# Patient Record
Sex: MALE | Race: WHITE | HISPANIC OR LATINO | Employment: PART TIME | URBAN - METROPOLITAN AREA
[De-identification: names, ages, dates, MRNs, and addresses within clinical notes are randomized per-mention and may not be internally consistent; named-entity substitution may affect disease eponyms.]

---
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
## 2020-04-22 ENCOUNTER — TELEPHONE (OUTPATIENT)
Dept: FAMILY MEDICINE CLINIC | Facility: CLINIC | Age: 20
End: 2020-04-22

## 2020-09-22 ENCOUNTER — OFFICE VISIT (OUTPATIENT)
Dept: FAMILY MEDICINE CLINIC | Facility: CLINIC | Age: 20
End: 2020-09-22

## 2020-09-22 VITALS
WEIGHT: 215 LBS | DIASTOLIC BLOOD PRESSURE: 84 MMHG | HEIGHT: 72 IN | SYSTOLIC BLOOD PRESSURE: 134 MMHG | BODY MASS INDEX: 29.12 KG/M2 | RESPIRATION RATE: 16 BRPM | TEMPERATURE: 99.1 F | HEART RATE: 74 BPM

## 2020-09-22 DIAGNOSIS — Z00.00 WELL ADULT EXAM: Primary | ICD-10-CM

## 2020-09-22 DIAGNOSIS — I10 ESSENTIAL HYPERTENSION: ICD-10-CM

## 2020-09-22 DIAGNOSIS — M32.9 SYSTEMIC LUPUS ERYTHEMATOSUS, UNSPECIFIED SLE TYPE, UNSPECIFIED ORGAN INVOLVEMENT STATUS (HCC): ICD-10-CM

## 2020-09-22 DIAGNOSIS — M32.14 STAGE IV LUPUS NEPHRITIS (WHO) (HCC): ICD-10-CM

## 2020-09-22 PROCEDURE — 99385 PREV VISIT NEW AGE 18-39: CPT | Performed by: FAMILY MEDICINE

## 2020-09-22 RX ORDER — LOSARTAN POTASSIUM 50 MG/1
50 TABLET ORAL DAILY
Qty: 90 TABLET | Refills: 1 | Status: SHIPPED | OUTPATIENT
Start: 2020-09-22 | End: 2021-10-15

## 2020-09-22 NOTE — PROGRESS NOTES
Subjective   Avi Mandel is a 20 y.o. male.     History of Present Illness     Avi Mandel 20 y.o. male who presents for yearly preventive exam.  His overall health is: good  He exercises he has been doing cardio and weights..  He does see his dentist regularly  His diet is varied diet and is healthy 75%  He describes his alcohol intake as none, pt is underage  He knows what his cholesterol is No  He is not sexually active  He does not have ED or other bedroom issues  Does patient smoke sometimes      He needs a new rheumatologist and possibly a new nephrologist  His rheum was peds and they will no longer see him due to age  He has not seen a nephrolgist in 3 years    Mood can be an issue  Can affect his motivation and his energy level  Does not feel he needs medicine at this time      The following portions of the patient's history were reviewed and updated as appropriate: allergies, current medications, past family history, past medical history, past social history, past surgical history and problem list.    Review of Systems   Constitutional: Negative.    HENT: Negative.    Eyes: Negative.    Respiratory: Negative.  Negative for shortness of breath.    Cardiovascular: Negative.  Negative for chest pain.   Gastrointestinal: Negative.  Negative for constipation and diarrhea.   Musculoskeletal: Negative.    Skin: Negative.    Neurological: Negative.    Psychiatric/Behavioral: Negative.  Negative for dysphoric mood. The patient is not nervous/anxious.    All other systems reviewed and are negative.      Objective   Physical Exam  Vitals signs and nursing note reviewed.   Constitutional:       General: He is not in acute distress.     Appearance: He is well-developed.   HENT:      Head: Normocephalic and atraumatic.      Right Ear: Tympanic membrane, ear canal and external ear normal.      Left Ear: Tympanic membrane, ear canal and external ear normal.      Nose: Nose normal.   Eyes:      Conjunctiva/sclera:  Conjunctivae normal.   Neck:      Musculoskeletal: Normal range of motion and neck supple.      Thyroid: No thyromegaly.   Cardiovascular:      Rate and Rhythm: Normal rate and regular rhythm.      Heart sounds: Normal heart sounds. No murmur.   Pulmonary:      Effort: Pulmonary effort is normal. No respiratory distress.      Breath sounds: Normal breath sounds.   Abdominal:      General: Bowel sounds are normal. There is no distension.      Palpations: Abdomen is soft. There is no mass.      Tenderness: There is no abdominal tenderness.   Lymphadenopathy:      Cervical: No cervical adenopathy.   Skin:     General: Skin is warm and dry.   Neurological:      Mental Status: He is alert and oriented to person, place, and time.   Psychiatric:         Behavior: Behavior normal.         Thought Content: Thought content normal.         Judgment: Judgment normal.         Assessment/Plan   Diagnoses and all orders for this visit:    Well adult exam    Essential hypertension  -     CBC & Differential  -     Comprehensive Metabolic Panel  -     losartan (COZAAR) 50 MG tablet; Take 1 tablet by mouth Daily.  -     Lipid Panel    Systemic lupus erythematosus, unspecified SLE type, unspecified organ involvement status (CMS/[facility])  -     Ambulatory Referral to Rheumatology  -     PASCUAL With / DsDNA, RNP, Sjogrens A / B, Smith  -     Sedimentation Rate  -     Rheumatoid Factor    Stage IV lupus nephritis (WHO) (CMS/[facility])  -     Comprehensive Metabolic Panel    counseled pt about well adult care including diet and exercise.  Will check labs for HTN, lupus, and kidney disease.  Will f/u pending results  No change in BP medicine, he is doing well on it  Will work on new rheumatologist and hold off on nephrologist pending labs

## 2020-09-23 LAB
ALBUMIN SERPL-MCNC: 4.7 G/DL (ref 3.5–5.2)
ALBUMIN/GLOB SERPL: 2.2 G/DL
ALP SERPL-CCNC: 61 U/L (ref 39–117)
ALT SERPL-CCNC: 25 U/L (ref 1–41)
ANA SER QL: NEGATIVE
AST SERPL-CCNC: 22 U/L (ref 1–40)
BASOPHILS # BLD AUTO: 0.05 10*3/MM3 (ref 0–0.2)
BASOPHILS NFR BLD AUTO: 0.9 % (ref 0–1.5)
BILIRUB SERPL-MCNC: 0.9 MG/DL (ref 0–1.2)
BUN SERPL-MCNC: 15 MG/DL (ref 6–20)
BUN/CREAT SERPL: 16.9 (ref 7–25)
CALCIUM SERPL-MCNC: 9.6 MG/DL (ref 8.6–10.5)
CHLORIDE SERPL-SCNC: 100 MMOL/L (ref 98–107)
CHOLEST SERPL-MCNC: 129 MG/DL (ref 0–200)
CO2 SERPL-SCNC: 24.1 MMOL/L (ref 22–29)
CREAT SERPL-MCNC: 0.89 MG/DL (ref 0.76–1.27)
EOSINOPHIL # BLD AUTO: 0.1 10*3/MM3 (ref 0–0.4)
EOSINOPHIL NFR BLD AUTO: 1.9 % (ref 0.3–6.2)
ERYTHROCYTE [DISTWIDTH] IN BLOOD BY AUTOMATED COUNT: 14 % (ref 12.3–15.4)
ERYTHROCYTE [SEDIMENTATION RATE] IN BLOOD BY WESTERGREN METHOD: 1 MM/HR (ref 0–15)
GLOBULIN SER CALC-MCNC: 2.1 GM/DL
GLUCOSE SERPL-MCNC: 88 MG/DL (ref 65–99)
HCT VFR BLD AUTO: 53.9 % (ref 37.5–51)
HDLC SERPL-MCNC: 48 MG/DL (ref 40–60)
HGB BLD-MCNC: 18.7 G/DL (ref 13–17.7)
IMM GRANULOCYTES # BLD AUTO: 0.02 10*3/MM3 (ref 0–0.05)
IMM GRANULOCYTES NFR BLD AUTO: 0.4 % (ref 0–0.5)
LDLC SERPL CALC-MCNC: 67 MG/DL (ref 0–100)
LYMPHOCYTES # BLD AUTO: 1.53 10*3/MM3 (ref 0.7–3.1)
LYMPHOCYTES NFR BLD AUTO: 28.4 % (ref 19.6–45.3)
MCH RBC QN AUTO: 29.9 PG (ref 26.6–33)
MCHC RBC AUTO-ENTMCNC: 34.7 G/DL (ref 31.5–35.7)
MCV RBC AUTO: 86.2 FL (ref 79–97)
MONOCYTES # BLD AUTO: 0.52 10*3/MM3 (ref 0.1–0.9)
MONOCYTES NFR BLD AUTO: 9.7 % (ref 5–12)
NEUTROPHILS # BLD AUTO: 3.16 10*3/MM3 (ref 1.7–7)
NEUTROPHILS NFR BLD AUTO: 58.7 % (ref 42.7–76)
NRBC BLD AUTO-RTO: 0 /100 WBC (ref 0–0.2)
PLATELET # BLD AUTO: 223 10*3/MM3 (ref 140–450)
POTASSIUM SERPL-SCNC: 4 MMOL/L (ref 3.5–5.2)
PROT SERPL-MCNC: 6.8 G/DL (ref 6–8.5)
RBC # BLD AUTO: 6.25 10*6/MM3 (ref 4.14–5.8)
RHEUMATOID FACT SERPL-ACNC: <10 IU/ML (ref 0–13.9)
SODIUM SERPL-SCNC: 140 MMOL/L (ref 136–145)
TRIGL SERPL-MCNC: 70 MG/DL (ref 0–150)
VLDLC SERPL CALC-MCNC: 14 MG/DL
WBC # BLD AUTO: 5.38 10*3/MM3 (ref 3.4–10.8)

## 2020-09-24 DIAGNOSIS — D75.1 POLYCYTHEMIA: Primary | ICD-10-CM

## 2020-10-16 ENCOUNTER — LAB (OUTPATIENT)
Dept: FAMILY MEDICINE CLINIC | Facility: CLINIC | Age: 20
End: 2020-10-16

## 2020-10-26 LAB
BASOPHILS # BLD AUTO: 0.1 X10E3/UL (ref 0–0.2)
BASOPHILS NFR BLD AUTO: 1 %
EOSINOPHIL # BLD AUTO: 0.1 X10E3/UL (ref 0–0.4)
EOSINOPHIL NFR BLD AUTO: 1 %
EPO SERPL-ACNC: 19.6 MIU/ML (ref 2.6–18.5)
ERYTHROCYTE [DISTWIDTH] IN BLOOD BY AUTOMATED COUNT: 13.8 % (ref 11.6–15.4)
HCT VFR BLD AUTO: 53.6 % (ref 37.5–51)
HGB BLD-MCNC: 18.6 G/DL (ref 13–17.7)
IMM GRANULOCYTES # BLD AUTO: 0 X10E3/UL (ref 0–0.1)
IMM GRANULOCYTES NFR BLD AUTO: 0 %
JAK2 P.V617F BLD/T QL: NORMAL
LAB DIRECTOR NAME PROVIDER: NORMAL
LABORATORY COMMENT REPORT: NORMAL
LYMPHOCYTES # BLD AUTO: 1.5 X10E3/UL (ref 0.7–3.1)
LYMPHOCYTES NFR BLD AUTO: 29 %
MCH RBC QN AUTO: 30.1 PG (ref 26.6–33)
MCHC RBC AUTO-ENTMCNC: 34.7 G/DL (ref 31.5–35.7)
MCV RBC AUTO: 87 FL (ref 79–97)
MONOCYTES # BLD AUTO: 0.5 X10E3/UL (ref 0.1–0.9)
MONOCYTES NFR BLD AUTO: 9 %
NEUTROPHILS # BLD AUTO: 3.1 X10E3/UL (ref 1.4–7)
NEUTROPHILS NFR BLD AUTO: 60 %
PATH INTERP BLD-IMP: ABNORMAL
PATH REV BLD -IMP: ABNORMAL
PATHOLOGIST NAME: ABNORMAL
PLATELET # BLD AUTO: 195 X10E3/UL (ref 150–450)
RBC # BLD AUTO: 6.17 X10E6/UL (ref 4.14–5.8)
WBC # BLD AUTO: 5.1 X10E3/UL (ref 3.4–10.8)

## 2020-10-27 DIAGNOSIS — D75.1 POLYCYTHEMIA: Primary | ICD-10-CM

## 2020-11-10 ENCOUNTER — LAB (OUTPATIENT)
Dept: LAB | Facility: HOSPITAL | Age: 20
End: 2020-11-10

## 2020-11-10 ENCOUNTER — CONSULT (OUTPATIENT)
Dept: ONCOLOGY | Facility: CLINIC | Age: 20
End: 2020-11-10

## 2020-11-10 VITALS
DIASTOLIC BLOOD PRESSURE: 106 MMHG | TEMPERATURE: 98.2 F | SYSTOLIC BLOOD PRESSURE: 180 MMHG | WEIGHT: 214 LBS | BODY MASS INDEX: 28.99 KG/M2 | HEIGHT: 72 IN | RESPIRATION RATE: 16 BRPM | OXYGEN SATURATION: 97 % | HEART RATE: 78 BPM

## 2020-11-10 DIAGNOSIS — D75.1 POLYCYTHEMIA: Primary | ICD-10-CM

## 2020-11-10 DIAGNOSIS — I10 ESSENTIAL HYPERTENSION: Primary | ICD-10-CM

## 2020-11-10 DIAGNOSIS — D75.1 POLYCYTHEMIA: ICD-10-CM

## 2020-11-10 LAB
ABO GROUP BLD: NORMAL
ALBUMIN SERPL-MCNC: 4.6 G/DL (ref 3.5–5.2)
ALBUMIN/GLOB SERPL: 1.8 G/DL
ALP SERPL-CCNC: 63 U/L (ref 39–117)
ALT SERPL W P-5'-P-CCNC: 25 U/L (ref 1–41)
ANION GAP SERPL CALCULATED.3IONS-SCNC: 10 MMOL/L (ref 5–15)
AST SERPL-CCNC: 24 U/L (ref 1–40)
BILIRUB SERPL-MCNC: 0.5 MG/DL (ref 0–1.2)
BUN SERPL-MCNC: 17 MG/DL (ref 6–20)
BUN/CREAT SERPL: 18.3 (ref 7–25)
CALCIUM SPEC-SCNC: 9.6 MG/DL (ref 8.6–10.5)
CHLORIDE SERPL-SCNC: 101 MMOL/L (ref 98–107)
CO2 SERPL-SCNC: 28 MMOL/L (ref 22–29)
CREAT SERPL-MCNC: 0.93 MG/DL (ref 0.76–1.27)
ERYTHROCYTE [DISTWIDTH] IN BLOOD BY AUTOMATED COUNT: 13.2 % (ref 12.3–15.4)
GFR SERPL CREATININE-BSD FRML MDRD: 104 ML/MIN/1.73
GLOBULIN UR ELPH-MCNC: 2.5 GM/DL
GLUCOSE SERPL-MCNC: 95 MG/DL (ref 65–99)
HCT VFR BLD AUTO: 51.8 % (ref 37.5–51)
HGB BLD-MCNC: 17.6 G/DL (ref 13–17.7)
LYMPHOCYTES # BLD AUTO: 1.6 10*3/MM3 (ref 0.7–3.1)
LYMPHOCYTES NFR BLD AUTO: 23.4 % (ref 19.6–45.3)
MCH RBC QN AUTO: 31 PG (ref 26.6–33)
MCHC RBC AUTO-ENTMCNC: 34.1 G/DL (ref 31.5–35.7)
MCV RBC AUTO: 91.1 FL (ref 79–97)
MONOCYTES # BLD AUTO: 0.2 10*3/MM3 (ref 0.1–0.9)
MONOCYTES NFR BLD AUTO: 3.2 % (ref 5–12)
NEUTROPHILS NFR BLD AUTO: 5 10*3/MM3 (ref 1.7–7)
NEUTROPHILS NFR BLD AUTO: 73.4 % (ref 42.7–76)
PLATELET # BLD AUTO: 201 10*3/MM3 (ref 140–450)
PMV BLD AUTO: 8.6 FL (ref 6–12)
POTASSIUM SERPL-SCNC: 3.6 MMOL/L (ref 3.5–5.2)
PROT SERPL-MCNC: 7.1 G/DL (ref 6–8.5)
RBC # BLD AUTO: 5.68 10*6/MM3 (ref 4.14–5.8)
RH BLD: NEGATIVE
SODIUM SERPL-SCNC: 139 MMOL/L (ref 136–145)
WBC # BLD AUTO: 6.8 10*3/MM3 (ref 3.4–10.8)

## 2020-11-10 PROCEDURE — 82668 ASSAY OF ERYTHROPOIETIN: CPT | Performed by: INTERNAL MEDICINE

## 2020-11-10 PROCEDURE — 85025 COMPLETE CBC W/AUTO DIFF WBC: CPT

## 2020-11-10 PROCEDURE — 81403 MOPATH PROCEDURE LEVEL 4: CPT

## 2020-11-10 PROCEDURE — 36415 COLL VENOUS BLD VENIPUNCTURE: CPT | Performed by: INTERNAL MEDICINE

## 2020-11-10 PROCEDURE — 81270 JAK2 GENE: CPT

## 2020-11-10 PROCEDURE — 86901 BLOOD TYPING SEROLOGIC RH(D): CPT

## 2020-11-10 PROCEDURE — 81402 MOPATH PROCEDURE LEVEL 3: CPT

## 2020-11-10 PROCEDURE — 99204 OFFICE O/P NEW MOD 45 MIN: CPT | Performed by: INTERNAL MEDICINE

## 2020-11-10 PROCEDURE — 80053 COMPREHEN METABOLIC PANEL: CPT

## 2020-11-10 PROCEDURE — 81219 CALR GENE COM VARIANTS: CPT

## 2020-11-10 PROCEDURE — 86900 BLOOD TYPING SEROLOGIC ABO: CPT

## 2020-11-10 NOTE — PROGRESS NOTES
New Patient Office Visit      Date: 11/10/2020     Patient Name: Avi Mandel  MRN: [id number]  : 2000  Referring Physician: Andrea Ivory    Chief Complaint: Establish care for polycythemia    History of Present Illness: Avi Mandel is a pleasant 20 y.o. male past medical history of SLE, hypertension who presents today for evaluation of polycythemia.  Patient has been in his usual state of health when he had labs checked by his PCP was notable for an elevated hemoglobin of 18.7 in 2020.  Repeat in 2020 was 18.6.  Per chart review he has had an elevated hemoglobin to at least 16.9 in 2017.  Patient does note that he has been told he snores at night.  He is also been a heavy Juul smoker as well during this time.  He does not take any exogenous testosterone.  He denies any shortness of breath, pruritus after hot shower, redness of his face.  He had EPO level checked which was elevated to 19.6.  JAK2 V617F mutation was negative.  He otherwise feels well and has no major complaints today.    Oncology History:    Oncology/Hematology History    No history exists.       Subjective      Review of Systems:     Constitutional: Negative for fevers, chills, or weight loss  Eyes: Negative for blurred vision or discharge         Ear/Nose/Throat: Negative for difficulty swallowing, sore throat, LAD                                                       Respiratory: Negative for cough, SOA, wheezing                                                                                        Cardiovascular: Negative for chest pain or palpitations                                                                  Gastrointestinal: Negative for nausea, vomiting or diarrhea                                                                     Genitourinary: Negative for dysuria or hematuria                                                                                           Musculoskeletal:  "Negative for any joint pains or muscle aches                                                                        Neurologic: Negative for any weakness, headaches, dizziness                                                                         Hematologic: Negative for any easy bleeding or bruising                                                                                   Psychiatric: Negative for anxiety or depression                             Past Medical History: History reviewed. No pertinent past medical history.    Past Surgical History:   Past Surgical History:   Procedure Laterality Date   • RENAL BIOPSY  2012       Family History:   Family History   Problem Relation Age of Onset   • No Known Problems Mother    • No Known Problems Father        Social History:   Social History     Socioeconomic History   • Marital status: Single     Spouse name: Not on file   • Number of children: Not on file   • Years of education: Not on file   • Highest education level: Not on file   Occupational History     Employer: [employer]   Tobacco Use   • Smoking status: Light Tobacco Smoker   • Smokeless tobacco: Never Used   Substance and Sexual Activity   • Alcohol use: No       Medications:     Current Outpatient Medications:   •  hydroxychloroquine (PLAQUENIL) 200 MG tablet, TAKE 2 TABLETS BY MOUTH EVERY DAY, Disp: , Rfl:   •  losartan (COZAAR) 50 MG tablet, Take 1 tablet by mouth Daily., Disp: 90 tablet, Rfl: 1    Allergies:   Allergies   Allergen Reactions   • No Known Drug Allergy        Objective     Physical Exam:  Vital Signs:   Vitals:    11/10/20 1119   BP: (!) 180/106  Comment: PT is very anxious   Pulse: 78   Resp: 16   Temp: 98.2 °F (36.8 °C)   SpO2: 97%   Weight: 97.1 kg (214 lb)   Height: 182.9 cm (72\")   PainSc: 0-No pain     Pain Score    11/10/20 1119   PainSc: 0-No pain     ECOG Performance Status: 0 - Asymptomatic    Constitutional: NAD, ECOG 0  Eyes: PERRLA, scleral anicteric  ENT: No LAD, " no thyromegaly  Respiratory: CTAB, no wheezing, rales, rhonchi  Cardiovascular: RRR, no murmurs, pulses 2+ bilaterally  Abdomen: soft, NT/ND, no HSM  Musculoskeletal: strength 5/5 bilaterally, no c/c/e  Neurologic: A&O x 3, CN II-XII intact grossly  Psych: mood and affect congruent, no SI or HI    Results Review:   No visits with results within 2 Week(s) from this visit.   Latest known visit with results is:   Orders Only on 10/16/2020   Component Date Value Ref Range Status   • WBC 10/16/2020 Comment   Final    Comment: No morphologic abnormality was detected on the Platt  stained smear.     • RBC 10/16/2020 Comment   Final    Comment: Erythrocytosis. Further evaluation indicated to distinguish  between primary and secondary causes.  RBC morphology is normal.     • Platelets 10/16/2020 Comment   Final    Few large and giant platelets were observed.   • Comment 10/16/2020 Comment   Final    Clinical correlation is suggested.   • Pathologist Review 10/16/2020 Comment   Final    Reviewed by: Wes Carver MD, Pathologist   • WBC 10/16/2020 5.1  3.4 - 10.8 x10E3/uL Final    **Verified by repeat analysis**   • RBC 10/16/2020 6.17* 4.14 - 5.80 x10E6/uL Final   • Hemoglobin 10/16/2020 18.6* 13.0 - 17.7 g/dL Final   • Hematocrit 10/16/2020 53.6* 37.5 - 51.0 % Final   • MCV 10/16/2020 87  79 - 97 fL Final   • MCH 10/16/2020 30.1  26.6 - 33.0 pg Final   • MCHC 10/16/2020 34.7  31.5 - 35.7 g/dL Final   • RDW 10/16/2020 13.8  11.6 - 15.4 % Final   • Platelets 10/16/2020 195  150 - 450 x10E3/uL Final   • Neutrophil Rel % 10/16/2020 60  Not Estab. % Final   • Lymphocyte Rel % 10/16/2020 29  Not Estab. % Final   • Monocyte Rel % 10/16/2020 9  Not Estab. % Final   • Eosinophil Rel % 10/16/2020 1  Not Estab. % Final   • Basophil Rel % 10/16/2020 1  Not Estab. % Final   • Neutrophils Absolute 10/16/2020 3.1  1.4 - 7.0 x10E3/uL Final   • Lymphocytes Absolute 10/16/2020 1.5  0.7 - 3.1 x10E3/uL Final   • Monocytes Absolute 10/16/2020 0.5   0.1 - 0.9 x10E3/uL Final   • Eosinophils Absolute 10/16/2020 0.1  0.0 - 0.4 x10E3/uL Final   • Basophils Absolute 10/16/2020 0.1  0.0 - 0.2 x10E3/uL Final   • Immature Granulocyte Rel % 10/16/2020 0  Not Estab. % Final   • Immature Grans Absolute 10/16/2020 0.0  0.0 - 0.1 x10E3/uL Final   • JAK2 V617F Mutation 10/16/2020 Comment   Final    Comment: Result: NEGATIVE for the JAK2 V617F mutation.  Interpretation:  The G to T nucleotide change encoding the V617F  mutation was not detected.  This result does not rule out the presence  of the JAK2 mutation at a level below the sensitivity of detection of  this assay, or the presence of other mutations within JAK2 not  detected by this assay.  This result does not rule out a diagnosis of  polycythemia vera, essential thrombocythemia or idiopathic  myelofibrosis as the V617F mutation is not detected in all patients  with these disorders.     • Background: 10/16/2020 Comment   Final    Comment: JAK2 is a cytoplasmic tyrosine kinase with a key role in signal  transduction from multiple hematopoietic growth factor receptors. A  point mutation within exon 14 of the JAK2 gene ([id number]) encoding a  valine to phenylalanine substitution at position 617 of the JAK2  protein (V617F) has been identified in most patients with polycythemia  vera, and in about half of those with either essential thrombocythemia  or idiopathic myelofibrosis. The V617F has also been detected,  although infrequently, in other myeloid disorders such as chronic  myelomonocytic leukemia and chronic neutrophilic luekemia. V617F is  an acquired mutation that alters a highly conserved valine present in  the negative regulatory JH2 domain of the JAK2 protein and is  predicted to dysregulate kinase activity.  Methodology:  Total genomic DNA was extracted and subjected to TaqMan real-time PCR  amplification/detection. Two amplification products per sample were  monitored by real-time PCR using primers/probes  specific                            to JAK2 wild  type (WT) and JAK2 mutant V617F. The [id number] Absolute Quantitation  software will compare the patient specimen valuse to the standard  curves and generate percent values for wild type and mutant type.  In vitro studies have indicated that this assay has an analytical  sensitivity of 1%.  References:  Piter EJ, Lisandro QUINN, Yovany PJ, et al. Acquired mutation of the  tyrosine kinase JAK2 in human myeloproliferative disorders. Lancet.  2005 Mar 19-25; 365(8093):0041-3600.  Parker C, Jeferson V, Roya Cornejo MUSHTAQ. A unique clonal JAK2 mutation leading  to constitutive signaling causes polycythaemia vera. Nature. 2005 Apr 28; 434(8876):2021-7436.  Adonis R, Hiram F, Callie AS, et al. A gain-of-function  mutation of JAK2 in myeloproliferative disorders. N Engl J Med. 2005 Apr 28; 352(31):2064-4744.     • Director Review 10/16/2020 Comment   Final    Comment: Nelda Nunez MD, PhD  Director, Molecular Oncology  [facility] [place] [address]  [phone]  This test was developed and its performance characteristics  determined by [facility]. It has not been cleared or approved  by the Food and Drug Administration.     • Erythropoietin 10/16/2020 19.6* 2.6 - 18.5 mIU/mL Final    Comment: eSight DxI 800 Immunoassay System  Values obtained with different assay methods or kits cannot be used  interchangeably. Results cannot be interpreted as absolute evidence  of the presence or absence of malignant disease.         No results found.    Assessment / Plan      Assessment/Plan:   Diagnoses and all orders for this visit:    1. Polycythemia (Primary)  -Noted on recent labs.  EPO level 19.6.  Eitan V617F mutation negative  -We will check Eitan exon 12/13 mutation, CALR and MPL mutation  -Given elevated EPO level will check CT abdomen/pelvis to rule out any renal lesion  -Given his snoring history will check sleep study  to rule out SHEBA  -     CBC & Differential; Future  -     Comprehensive Metabolic Panel; Future  -     Erythropoietin  -     JAK2 V617F, Rfx CALR/E12-15/MPL; Future  -     CT Abdomen Pelvis With Contrast; Future  -     Ambulatory Referral to Sleep Medicine  -     Type & Screen; Future    2.  Hypertension  -Blood pressure 180/106 today.  Patient compliant with his antihypertensive medications.  -Review of blood pressure shows normal value in September 2020  -Patient states he is very anxious about his appointment today and this is likely why his blood pressure is high  -Advised patient to monitor blood pressure at home and to contact his PCP should his values continue to remain elevated      Follow Up:   Follow-up in 3 weeks     Saad Spicer MD  Hematology and Oncology     Please note that portions of this note may have been completed with a voice recognition program. Efforts were made to edit the dictations, but occasionally words are mistranscribed.

## 2020-11-11 LAB — EPO SERPL-ACNC: 5.1 MIU/ML (ref 2.6–18.5)

## 2020-11-23 LAB
CALR EXON 9 MUT ANL BLD/T: NORMAL
CITATION REF LAB TEST: NORMAL
JAK2 GENE MUT ANL BLD/T: NORMAL
JAK2 P.V617F BLD/T QL: NORMAL
LAB DIRECTOR NAME PROVIDER: NORMAL
LABORATORY COMMENT REPORT: NORMAL
LABORATORY COMMENT REPORT: NORMAL
Lab: NORMAL
MPL GENE MUT TESTED BLD/T: NORMAL
MPL P.W515L+W515K+S505N BLD/T QL: NORMAL
REF LAB TEST METHOD: NORMAL
REF LAB TEST METHOD: NORMAL
REFLEX: NORMAL
SERVICE CMNT-IMP: NORMAL
SPECIMEN PREPARATION: NORMAL
SPECIMEN PREPARATION: NORMAL

## 2021-01-27 ENCOUNTER — TELEPHONE (OUTPATIENT)
Dept: FAMILY MEDICINE CLINIC | Facility: CLINIC | Age: 21
End: 2021-01-27

## 2021-01-27 NOTE — TELEPHONE ENCOUNTER
Typically this type of letter would come from his rheumatologist that is treating his lupus.  I recommend he speak with them

## 2021-01-27 NOTE — TELEPHONE ENCOUNTER
PATIENT CALLED AND HE SAID HIS SCHOOL ([employer] [employer])  IS WANTING DR MONTANEZ TO WRITE HIM A LETTER  AS HE HAS LUPUS AND IT IS AN AUTOIMMUNE DISORDER ; HE SAID SAID THIS  LETTER WOULD ALLOW HIM TO TAKE ONLINE CLASSES VS CLASSES IN PERSON; PATIENT SAID IT WOULD BE OK IF HE NEEDS TO MAKE AN APPT WITH DR AVINA; PLEASE CALL TO ADVISE..    ADELINA: [phone]    PATIENT ALSO SAID LAST November HE HAD TO WITHDRAWAL FROM A CLASS DUE NOT TO FEELING WELL HE WASN'T SURE IF HE HAD COVD THEN OR NOT--ITS A SAP APPEAL

## 2021-03-11 ENCOUNTER — CONSULT (OUTPATIENT)
Dept: SLEEP MEDICINE | Facility: HOSPITAL | Age: 21
End: 2021-03-11

## 2021-03-11 VITALS
HEART RATE: 85 BPM | BODY MASS INDEX: 29.16 KG/M2 | SYSTOLIC BLOOD PRESSURE: 165 MMHG | HEIGHT: 73 IN | DIASTOLIC BLOOD PRESSURE: 101 MMHG | OXYGEN SATURATION: 97 % | WEIGHT: 220 LBS

## 2021-03-11 DIAGNOSIS — D75.1 POLYCYTHEMIA: ICD-10-CM

## 2021-03-11 DIAGNOSIS — G47.33 OSA (OBSTRUCTIVE SLEEP APNEA): ICD-10-CM

## 2021-03-11 DIAGNOSIS — G47.19 EXCESSIVE DAYTIME SLEEPINESS: Primary | ICD-10-CM

## 2021-03-11 DIAGNOSIS — R06.83 SNORING: ICD-10-CM

## 2021-03-11 PROCEDURE — 99244 OFF/OP CNSLTJ NEW/EST MOD 40: CPT | Performed by: INTERNAL MEDICINE

## 2021-03-11 RX ORDER — LISINOPRIL 10 MG/1
10 TABLET ORAL DAILY
COMMUNITY
End: 2021-10-15 | Stop reason: SDUPTHER

## 2021-03-11 NOTE — PROGRESS NOTES
"Avi Mandel is a 21 y.o. male.   Chief Complaint   Patient presents with   • Sleeping Problem       HPI     21 y.o. male seen in consultation at the request of Saad Spicer MD for evaluation of the above.     He underwent routine lab studies ordered by Dr. Ivory and was found to have an elevated hemoglobin.  This has been repeatedly present.  He has a history of hypertension as well as lupus nephritis.  He is on no testosterone.  He has no known lung disease.  The etiology of his polycythemia was unclear and he was referred to Dr. Spicer.  A hematologic work-up has ensued but because of the patient's observed snoring in the past as well as daytime somnolence he was referred here for further evaluation for possible sleep apnea as a cause of secondary polycythemia.    The patient does note excessive daytime somnolence.  He does awaken with headaches in the morning.  He has been noted to snore.  Nobody observed to sleep at this point so he does not know if he has significant apneas.  He averages 6-8 hours of sleep per night.    He denies any hypnagogic hallucinations or frequent sleep paralysis.  He thinks he has experienced sleep paralysis once in his life.  He has no cataplexy.    [place] Scale is: 10/24    The patient's relevant past medical, surgical, family, and social history reviewed and updated in Epic as appropriate.    Current medications are:   Current Outpatient Medications:   •  hydroxychloroquine (PLAQUENIL) 200 MG tablet, TAKE 2 TABLETS BY MOUTH EVERY DAY, Disp: , Rfl:   •  lisinopril (PRINIVIL,ZESTRIL) 10 MG tablet, Take 10 mg by mouth Daily., Disp: , Rfl:   •  losartan (COZAAR) 50 MG tablet, Take 1 tablet by mouth Daily., Disp: 90 tablet, Rfl: 1.    Review of Systems    Review of Systems  ROS documented in patient questionnaire ×14 systems.  Reviewed with patient.  Otherwise negative except as noted in HPI.    Physical Exam    Blood pressure (!) 165/101, pulse 85, height 185.4 cm (73\"), " weight 99.8 kg (220 lb), SpO2 97 %. Body mass index is 29.03 kg/m².    Physical Exam  Vitals and nursing note reviewed.   Constitutional:       Appearance: Normal appearance. He is well-developed.   HENT:      Head: Normocephalic and atraumatic.      Nose: Nose normal.      Mouth/Throat:      Mouth: Mucous membranes are moist.      Pharynx: Oropharynx is clear. No oropharyngeal exudate.      Comments: Clas III airway  Eyes:      General: No scleral icterus.     Conjunctiva/sclera: Conjunctivae normal.   Neck:      Thyroid: No thyromegaly.      Trachea: No tracheal deviation.   Cardiovascular:      Rate and Rhythm: Normal rate and regular rhythm.      Heart sounds: No murmur. No friction rub. No gallop.    Pulmonary:      Effort: Pulmonary effort is normal. No respiratory distress.      Breath sounds: No wheezing or rales.   Musculoskeletal:         General: No deformity. Normal range of motion.   Skin:     General: Skin is warm and dry.      Findings: No rash.   Neurological:      Mental Status: He is alert and oriented to person, place, and time.   Psychiatric:         Behavior: Behavior normal.         Thought Content: Thought content normal.         DATA:    11/10/2020 hemoglobin 17.6    Reviewed Dr. Spicer's note from 11/10/2020 and Dr. Ivory's note from 9/22/2020    ASSESSMENT:    Problem List Items Addressed This Visit        Pulmonary Problems    SHEBA (obstructive sleep apnea) - possible    Relevant Orders    Home Sleep Study    Snoring    Relevant Orders    Home Sleep Study       Other    Excessive daytime sleepiness - Primary    Relevant Orders    Home Sleep Study    Polycythemia          21-year-old male who presents with polycythemia of unclear etiology, snoring, daytime somnolence, and morning headaches.  I think that undiagnosed obstructive sleep apnea is a good possibility and work-up and treatment should be pursued.    PLAN:    1. I discussed the possible diagnosis of obstructive sleep apnea as well  as its potential relationship to his polycythemia and his symptoms.  He expressed understanding and was interested in pursuing a work-up including a home sleep apnea test  2. I discussed treatment options for obstructive sleep apnea including CPAP therapy and he did express interest in initiating CPAP therapy if deemed appropriate in the future.  3. Will advise further after his HSAT and arrange close follow-up in the sleep center    I have reviewed the results of my evaluation and impression and discussed my recommendations in detail with the patient.    Level of Risk Moderate due to: undiagnosed new problem    Signed by  Justin Erwin MD    March 11, 2021      CC: Andrea Ivory MD Peterson, Shawn L, MD

## 2021-10-15 ENCOUNTER — OFFICE VISIT (OUTPATIENT)
Dept: FAMILY MEDICINE CLINIC | Facility: CLINIC | Age: 21
End: 2021-10-15

## 2021-10-15 VITALS
HEIGHT: 74 IN | SYSTOLIC BLOOD PRESSURE: 128 MMHG | TEMPERATURE: 98.6 F | BODY MASS INDEX: 28.49 KG/M2 | RESPIRATION RATE: 18 BRPM | DIASTOLIC BLOOD PRESSURE: 88 MMHG | HEART RATE: 74 BPM | WEIGHT: 222 LBS

## 2021-10-15 DIAGNOSIS — Z00.00 WELL ADULT EXAM: Primary | ICD-10-CM

## 2021-10-15 DIAGNOSIS — D75.1 POLYCYTHEMIA: ICD-10-CM

## 2021-10-15 DIAGNOSIS — M32.14 STAGE IV LUPUS NEPHRITIS (WHO) (HCC): ICD-10-CM

## 2021-10-15 DIAGNOSIS — I10 PRIMARY HYPERTENSION: ICD-10-CM

## 2021-10-15 DIAGNOSIS — Z11.59 ENCOUNTER FOR HEPATITIS C SCREENING TEST FOR LOW RISK PATIENT: ICD-10-CM

## 2021-10-15 DIAGNOSIS — M32.9 SYSTEMIC LUPUS ERYTHEMATOSUS, UNSPECIFIED SLE TYPE, UNSPECIFIED ORGAN INVOLVEMENT STATUS (HCC): ICD-10-CM

## 2021-10-15 DIAGNOSIS — N18.1 CKD (CHRONIC KIDNEY DISEASE) STAGE 1, GFR 90 ML/MIN OR GREATER: ICD-10-CM

## 2021-10-15 PROBLEM — R06.83 SNORING: Status: RESOLVED | Noted: 2021-03-11 | Resolved: 2021-10-15

## 2021-10-15 PROBLEM — G47.19 EXCESSIVE DAYTIME SLEEPINESS: Status: RESOLVED | Noted: 2021-03-11 | Resolved: 2021-10-15

## 2021-10-15 PROCEDURE — 99395 PREV VISIT EST AGE 18-39: CPT | Performed by: FAMILY MEDICINE

## 2021-10-15 PROCEDURE — 86580 TB INTRADERMAL TEST: CPT | Performed by: FAMILY MEDICINE

## 2021-10-15 RX ORDER — LISINOPRIL 10 MG/1
10 TABLET ORAL DAILY
Qty: 90 TABLET | Refills: 1 | Status: SHIPPED | OUTPATIENT
Start: 2021-10-15 | End: 2021-11-09 | Stop reason: SDUPTHER

## 2021-10-15 NOTE — PROGRESS NOTES
Subjective   Avi Mandel is a 21 y.o. male.     History of Present Illness     Avi Mandel 21 y.o. male who presents for yearly preventive exam.  His overall health is: good  He exercises [employer] about 5 times a week.  He does not see his dentist regularly  His diet is typical american  He describes his alcohol intake as none  He knows what his cholesterol is No  He is not sexually active  Does patient smoke No    He needs a form completed for work      The following portions of the patient's history were reviewed and updated as appropriate: allergies, current medications, past family history, past medical history, past social history, past surgical history and problem list.    Review of Systems   Constitutional: Negative.    HENT: Negative.    Respiratory: Negative.    Cardiovascular: Negative.    Gastrointestinal: Negative.    Skin: Negative.    Psychiatric/Behavioral: Negative.        Objective   Physical Exam  Vitals and nursing note reviewed.   Constitutional:       General: He is not in acute distress.     Appearance: Normal appearance. He is well-developed.   HENT:      Head: Normocephalic and atraumatic.      Right Ear: Tympanic membrane, ear canal and external ear normal.      Left Ear: Tympanic membrane, ear canal and external ear normal.      Nose: Nose normal.   Eyes:      Extraocular Movements: Extraocular movements intact.      Conjunctiva/sclera: Conjunctivae normal.   Neck:      Thyroid: No thyromegaly.   Cardiovascular:      Rate and Rhythm: Normal rate and regular rhythm.      Heart sounds: Normal heart sounds. No murmur heard.      Pulmonary:      Effort: Pulmonary effort is normal. No respiratory distress.      Breath sounds: Normal breath sounds.   Abdominal:      General: Bowel sounds are normal. There is no distension.      Palpations: Abdomen is soft.      Tenderness: There is no abdominal tenderness.   Musculoskeletal:      Cervical back: Normal range of motion and neck  supple.   Lymphadenopathy:      Cervical: No cervical adenopathy.   Skin:     General: Skin is warm and dry.   Neurological:      Mental Status: He is alert and oriented to person, place, and time.   Psychiatric:         Mood and Affect: Mood normal.         Behavior: Behavior normal.         Thought Content: Thought content normal.         Judgment: Judgment normal.         Assessment/Plan   Diagnoses and all orders for this visit:    1. Well adult exam (Primary)  -     Cancel: QuantiFERON TB Gold  -     TB Skin Test    2. Primary hypertension  -     lisinopril (PRINIVIL,ZESTRIL) 10 MG tablet; Take 1 tablet by mouth Daily.  Dispense: 90 tablet; Refill: 1  -     CBC & Differential  -     Comprehensive Metabolic Panel  -     ABO/Rh    3. CKD (chronic kidney disease) stage 1, GFR 90 ml/min or greater  -     lisinopril (PRINIVIL,ZESTRIL) 10 MG tablet; Take 1 tablet by mouth Daily.  Dispense: 90 tablet; Refill: 1  -     Comprehensive Metabolic Panel    4. 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  Dispense: 90 tablet; Refill: 1    5. Polycythemia  -     CBC & Differential    6. Systemic lupus erythematosus, unspecified SLE type, unspecified organ involvement status (HCC)  -     Ambulatory Referral to Rheumatology    7. Encounter for hepatitis C screening test for low risk patient  -     Hepatitis C Antibody    form completed for work, no limitations nor issues noted.  Will continue lisinopril and ehck labs including renal function  Rheum referral placed as pt's rheumatologist left town and he needs a new one.  F/u pending labs

## 2021-10-16 LAB
ABO GROUP BLD: NORMAL
ALBUMIN SERPL-MCNC: 4.4 G/DL (ref 3.5–5.2)
ALBUMIN/GLOB SERPL: 1.9 G/DL
ALP SERPL-CCNC: 68 U/L (ref 39–117)
ALT SERPL-CCNC: 25 U/L (ref 1–41)
AST SERPL-CCNC: 24 U/L (ref 1–40)
BASOPHILS # BLD AUTO: 0.06 10*3/MM3 (ref 0–0.2)
BASOPHILS NFR BLD AUTO: 1.2 % (ref 0–1.5)
BILIRUB SERPL-MCNC: 0.6 MG/DL (ref 0–1.2)
BUN SERPL-MCNC: 16 MG/DL (ref 6–20)
BUN/CREAT SERPL: 17.8 (ref 7–25)
CALCIUM SERPL-MCNC: 9.4 MG/DL (ref 8.6–10.5)
CHLORIDE SERPL-SCNC: 101 MMOL/L (ref 98–107)
CO2 SERPL-SCNC: 29.5 MMOL/L (ref 22–29)
CREAT SERPL-MCNC: 0.9 MG/DL (ref 0.76–1.27)
EOSINOPHIL # BLD AUTO: 0.08 10*3/MM3 (ref 0–0.4)
EOSINOPHIL NFR BLD AUTO: 1.6 % (ref 0.3–6.2)
ERYTHROCYTE [DISTWIDTH] IN BLOOD BY AUTOMATED COUNT: 13.5 % (ref 12.3–15.4)
GLOBULIN SER CALC-MCNC: 2.3 GM/DL
GLUCOSE SERPL-MCNC: 82 MG/DL (ref 65–99)
HCT VFR BLD AUTO: 54.8 % (ref 37.5–51)
HCV AB S/CO SERPL IA: <0.1 S/CO RATIO (ref 0–0.9)
HGB BLD-MCNC: 18.3 G/DL (ref 13–17.7)
IMM GRANULOCYTES # BLD AUTO: 0.01 10*3/MM3 (ref 0–0.05)
IMM GRANULOCYTES NFR BLD AUTO: 0.2 % (ref 0–0.5)
LYMPHOCYTES # BLD AUTO: 1.33 10*3/MM3 (ref 0.7–3.1)
LYMPHOCYTES NFR BLD AUTO: 26.7 % (ref 19.6–45.3)
MCH RBC QN AUTO: 29 PG (ref 26.6–33)
MCHC RBC AUTO-ENTMCNC: 33.4 G/DL (ref 31.5–35.7)
MCV RBC AUTO: 87 FL (ref 79–97)
MONOCYTES # BLD AUTO: 0.42 10*3/MM3 (ref 0.1–0.9)
MONOCYTES NFR BLD AUTO: 8.4 % (ref 5–12)
NEUTROPHILS # BLD AUTO: 3.08 10*3/MM3 (ref 1.7–7)
NEUTROPHILS NFR BLD AUTO: 61.9 % (ref 42.7–76)
NRBC BLD AUTO-RTO: 0 /100 WBC (ref 0–0.2)
PLATELET # BLD AUTO: 233 10*3/MM3 (ref 140–450)
POTASSIUM SERPL-SCNC: 4.1 MMOL/L (ref 3.5–5.2)
PROT SERPL-MCNC: 6.7 G/DL (ref 6–8.5)
RBC # BLD AUTO: 6.3 10*6/MM3 (ref 4.14–5.8)
RH BLD: NEGATIVE
SODIUM SERPL-SCNC: 142 MMOL/L (ref 136–145)
WBC # BLD AUTO: 4.98 10*3/MM3 (ref 3.4–10.8)

## 2021-10-18 LAB
INDURATION: 0 MM (ref 0–10)
Lab: NORMAL
Lab: NORMAL
TB SKIN TEST: NEGATIVE

## 2021-10-20 ENCOUNTER — LAB (OUTPATIENT)
Dept: FAMILY MEDICINE CLINIC | Facility: CLINIC | Age: 21
End: 2021-10-20

## 2021-10-20 ENCOUNTER — TELEPHONE (OUTPATIENT)
Dept: FAMILY MEDICINE CLINIC | Facility: CLINIC | Age: 21
End: 2021-10-20

## 2021-10-20 DIAGNOSIS — D75.1 POLYCYTHEMIA: Primary | ICD-10-CM

## 2021-10-20 NOTE — TELEPHONE ENCOUNTER
This is likely a stable elevation og his hemoglobin and hematocrit but I do want to do some more labs and set him up with blood specialist for further evaluation.  Orders placed (ok to come in and have drawn at his convenience) and will work on referral to blood specialist to see if anything further is needed.    Don't want him to freak out but would like to look into this minor elevation of his blood counts.

## 2021-10-25 ENCOUNTER — TELEPHONE (OUTPATIENT)
Dept: FAMILY MEDICINE CLINIC | Facility: CLINIC | Age: 21
End: 2021-10-25

## 2021-10-25 NOTE — TELEPHONE ENCOUNTER
Pt has physical scheduled 10/26 but recently had one.  Can we call and see if this needs to be canceled.

## 2021-11-01 LAB
BASOPHILS # BLD AUTO: 0.1 X10E3/UL (ref 0–0.2)
BASOPHILS NFR BLD AUTO: 1 %
CALR EXON 9 MUT ANL BLD/T: NORMAL
CITATION REF LAB TEST: NORMAL
EOSINOPHIL # BLD AUTO: 0.1 X10E3/UL (ref 0–0.4)
EOSINOPHIL NFR BLD AUTO: 1 %
EPO SERPL-ACNC: 19 MIU/ML (ref 2.6–18.5)
ERYTHROCYTE [DISTWIDTH] IN BLOOD BY AUTOMATED COUNT: 13.4 % (ref 11.6–15.4)
HCT VFR BLD AUTO: 54.1 % (ref 37.5–51)
HGB BLD-MCNC: 17.9 G/DL (ref 13–17.7)
IMM GRANULOCYTES # BLD AUTO: 0 X10E3/UL (ref 0–0.1)
IMM GRANULOCYTES NFR BLD AUTO: 0 %
JAK2 P.V617F BLD/T QL: NORMAL
LAB DIRECTOR NAME PROVIDER: NORMAL
LABORATORY COMMENT REPORT: NORMAL
LYMPHOCYTES # BLD AUTO: 1.3 X10E3/UL (ref 0.7–3.1)
LYMPHOCYTES NFR BLD AUTO: 18 %
Lab: NORMAL
MCH RBC QN AUTO: 29.1 PG (ref 26.6–33)
MCHC RBC AUTO-ENTMCNC: 33.1 G/DL (ref 31.5–35.7)
MCV RBC AUTO: 88 FL (ref 79–97)
MONOCYTES # BLD AUTO: 0.5 X10E3/UL (ref 0.1–0.9)
MONOCYTES NFR BLD AUTO: 7 %
MPL GENE MUT TESTED BLD/T: NORMAL
MPL P.W515L+W515K+S505N BLD/T QL: NORMAL
NEUTROPHILS # BLD AUTO: 5.2 X10E3/UL (ref 1.4–7)
NEUTROPHILS NFR BLD AUTO: 73 %
PATH INTERP BLD-IMP: ABNORMAL
PATH REV BLD -IMP: ABNORMAL
PATHOLOGIST NAME: ABNORMAL
PLATELET # BLD AUTO: 205 X10E3/UL (ref 150–450)
RBC # BLD AUTO: 6.16 X10E6/UL (ref 4.14–5.8)
REF LAB TEST METHOD: NORMAL
REF LAB TEST METHOD: NORMAL
REFLEX: NORMAL
SERVICE CMNT-IMP: NORMAL
SPECIMEN PREPARATION: NORMAL
WBC # BLD AUTO: 7.1 X10E3/UL (ref 3.4–10.8)

## 2021-11-09 ENCOUNTER — OFFICE VISIT (OUTPATIENT)
Dept: FAMILY MEDICINE CLINIC | Facility: CLINIC | Age: 21
End: 2021-11-09

## 2021-11-09 VITALS
RESPIRATION RATE: 18 BRPM | HEIGHT: 74 IN | OXYGEN SATURATION: 98 % | DIASTOLIC BLOOD PRESSURE: 98 MMHG | SYSTOLIC BLOOD PRESSURE: 148 MMHG | TEMPERATURE: 97.8 F | WEIGHT: 228.6 LBS | HEART RATE: 77 BPM | BODY MASS INDEX: 29.34 KG/M2

## 2021-11-09 DIAGNOSIS — N18.1 CKD (CHRONIC KIDNEY DISEASE) STAGE 1, GFR 90 ML/MIN OR GREATER: ICD-10-CM

## 2021-11-09 DIAGNOSIS — I10 RESISTANT HYPERTENSION: ICD-10-CM

## 2021-11-09 DIAGNOSIS — I10 PRIMARY HYPERTENSION: ICD-10-CM

## 2021-11-09 DIAGNOSIS — M32.14 STAGE IV LUPUS NEPHRITIS (WHO) (HCC): ICD-10-CM

## 2021-11-09 DIAGNOSIS — R41.840 ATTENTION DEFICIT: Primary | ICD-10-CM

## 2021-11-09 PROCEDURE — 99214 OFFICE O/P EST MOD 30 MIN: CPT | Performed by: FAMILY MEDICINE

## 2021-11-09 RX ORDER — LISINOPRIL 20 MG/1
20 TABLET ORAL DAILY
Qty: 30 TABLET | Refills: 1 | Status: SHIPPED | OUTPATIENT
Start: 2021-11-09 | End: 2023-02-03 | Stop reason: SDUPTHER

## 2021-11-09 RX ORDER — ATOMOXETINE 40 MG/1
40 CAPSULE ORAL DAILY
Qty: 7 CAPSULE | Refills: 0 | Status: SHIPPED | OUTPATIENT
Start: 2021-11-09 | End: 2023-03-07 | Stop reason: SDUPTHER

## 2021-11-09 RX ORDER — ATOMOXETINE 80 MG/1
80 CAPSULE ORAL DAILY
Qty: 30 CAPSULE | Refills: 1 | Status: SHIPPED | OUTPATIENT
Start: 2021-11-09 | End: 2023-03-07 | Stop reason: SDUPTHER

## 2021-11-09 NOTE — PROGRESS NOTES
Subjective   Avi Mandel is a 21 y.o. male.     History of Present Illness     He notes that he has been struggling with school  [employer] is where he is going  Just cannot focus and stay on target  Focus has been an issue since he was younger    His BP is still very high and I rechecked it at 162/94  He is taking lisinopril 10  He does not check his BP at home  No CP noted    Still waiting hematology eval for polycyethmia of undetermined cause.    Review of Systems   Constitutional: Negative.    Psychiatric/Behavioral: Positive for decreased concentration.       Objective   Physical Exam  Vitals and nursing note reviewed.   Constitutional:       General: He is not in acute distress.     Appearance: Normal appearance. He is well-developed.   Cardiovascular:      Rate and Rhythm: Normal rate and regular rhythm.      Heart sounds: Normal heart sounds.   Pulmonary:      Effort: Pulmonary effort is normal.      Breath sounds: Normal breath sounds.   Neurological:      Mental Status: He is alert and oriented to person, place, and time.   Psychiatric:         Mood and Affect: Mood normal.         Behavior: Behavior normal.         Thought Content: Thought content normal.         Judgment: Judgment normal.         Assessment/Plan   Diagnoses and all orders for this visit:    1. Attention deficit (Primary)  -     atomoxetine (Strattera) 40 MG capsule; Take 1 capsule by mouth Daily. The first week  Dispense: 7 capsule; Refill: 0  -     atomoxetine (Strattera) 80 MG capsule; Take 1 capsule by mouth Daily.  Dispense: 30 capsule; Refill: 1    2. Primary hypertension  -     lisinopril (PRINIVIL,ZESTRIL) 20 MG tablet; Take 1 tablet by mouth Daily.  Dispense: 30 tablet; Refill: 1    3. CKD (chronic kidney disease) stage 1, GFR 90 ml/min or greater  -     lisinopril (PRINIVIL,ZESTRIL) 20 MG tablet; Take 1 tablet by mouth Daily.  Dispense: 30 tablet; Refill: 1    4. 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  Dispense: 30 tablet; Refill: 1    5. Resistant hypertension  -     US Renal Bilateral; Future    discussed attention deficit and told him I would try strattera but if anything else needed he would have to have evaluation by psych for ADHD and they would have to write for any controlled medicine.  Names given to pt if this is needed  BP too high, will check renal artery US and increase lisinopril to 20 and he may even need 40.

## 2021-11-19 ENCOUNTER — TELEPHONE (OUTPATIENT)
Dept: FAMILY MEDICINE CLINIC | Facility: CLINIC | Age: 21
End: 2021-11-19

## 2021-11-22 ENCOUNTER — HOSPITAL ENCOUNTER (OUTPATIENT)
Dept: PULMONOLOGY | Facility: HOSPITAL | Age: 21
Discharge: HOME | End: 2021-11-22

## 2021-11-22 ENCOUNTER — APPOINTMENT (OUTPATIENT)
Dept: ULTRASOUND IMAGING | Facility: HOSPITAL | Age: 21
End: 2021-11-22

## 2021-11-22 ENCOUNTER — LAB (OUTPATIENT)
Dept: LAB | Facility: HOSPITAL | Age: 21
End: 2021-11-22

## 2021-11-22 ENCOUNTER — CONSULT (OUTPATIENT)
Dept: ONCOLOGY | Facility: CLINIC | Age: 21
End: 2021-11-22

## 2021-11-22 VITALS
BODY MASS INDEX: 29 KG/M2 | RESPIRATION RATE: 16 BRPM | WEIGHT: 226 LBS | TEMPERATURE: 97.1 F | DIASTOLIC BLOOD PRESSURE: 109 MMHG | SYSTOLIC BLOOD PRESSURE: 207 MMHG | HEIGHT: 74 IN | OXYGEN SATURATION: 97 % | HEART RATE: 74 BPM

## 2021-11-22 DIAGNOSIS — D75.1 POLYCYTHEMIA: Primary | ICD-10-CM

## 2021-11-22 DIAGNOSIS — D75.1 POLYCYTHEMIA: ICD-10-CM

## 2021-11-22 LAB
ALBUMIN SERPL-MCNC: 4.3 G/DL (ref 3.5–5.2)
ALBUMIN/GLOB SERPL: 1.6 G/DL
ALP SERPL-CCNC: 73 U/L (ref 39–117)
ALT SERPL W P-5'-P-CCNC: 20 U/L (ref 1–41)
ANION GAP SERPL CALCULATED.3IONS-SCNC: 12 MMOL/L (ref 5–15)
ARTERIAL PATENCY WRIST A: ABNORMAL
AST SERPL-CCNC: 22 U/L (ref 1–40)
ATMOSPHERIC PRESS: ABNORMAL MM[HG]
BASE EXCESS BLDA CALC-SCNC: 3.3 MMOL/L (ref 0–2)
BDY SITE: ABNORMAL
BILIRUB SERPL-MCNC: 0.8 MG/DL (ref 0–1.2)
BODY TEMPERATURE: 37 C
BUN SERPL-MCNC: 17 MG/DL (ref 6–20)
BUN/CREAT SERPL: 19.5 (ref 7–25)
CALCIUM SPEC-SCNC: 9.4 MG/DL (ref 8.6–10.5)
CHLORIDE SERPL-SCNC: 101 MMOL/L (ref 98–107)
CO2 BLDA-SCNC: 29.4 MMOL/L (ref 22–33)
CO2 SERPL-SCNC: 26 MMOL/L (ref 22–29)
COHGB MFR BLD: 0.8 % (ref 0–2)
CREAT SERPL-MCNC: 0.87 MG/DL (ref 0.76–1.27)
ERYTHROCYTE [DISTWIDTH] IN BLOOD BY AUTOMATED COUNT: 13.9 % (ref 12.3–15.4)
FERRITIN SERPL-MCNC: 36.38 NG/ML (ref 30–400)
GFR SERPL CREATININE-BSD FRML MDRD: 111 ML/MIN/1.73
GLOBULIN UR ELPH-MCNC: 2.7 GM/DL
GLUCOSE SERPL-MCNC: 94 MG/DL (ref 65–99)
HCO3 BLDA-SCNC: 28.1 MMOL/L (ref 20–26)
HCT VFR BLD AUTO: 54.6 % (ref 37.5–51)
HCT VFR BLD CALC: 56.2 % (ref 38–51)
HGB BLD-MCNC: 18.1 G/DL (ref 13–17.7)
HGB BLDA-MCNC: 18.3 G/DL (ref 13.5–17.5)
INHALED O2 CONCENTRATION: 21 %
IRON 24H UR-MRATE: 126 MCG/DL (ref 59–158)
IRON SATN MFR SERPL: 30 % (ref 20–50)
LDH SERPL-CCNC: 197 U/L (ref 135–225)
LYMPHOCYTES # BLD AUTO: 1.3 10*3/MM3 (ref 0.7–3.1)
LYMPHOCYTES NFR BLD AUTO: 20 % (ref 19.6–45.3)
MCH RBC QN AUTO: 29.5 PG (ref 26.6–33)
MCHC RBC AUTO-ENTMCNC: 33.2 G/DL (ref 31.5–35.7)
MCV RBC AUTO: 88.9 FL (ref 79–97)
METHGB BLD QL: -0.1 % (ref 0–1.5)
MODALITY: ABNORMAL
MONOCYTES # BLD AUTO: 0.4 10*3/MM3 (ref 0.1–0.9)
MONOCYTES NFR BLD AUTO: 6.1 % (ref 5–12)
NEUTROPHILS NFR BLD AUTO: 4.8 10*3/MM3 (ref 1.7–7)
NEUTROPHILS NFR BLD AUTO: 73.9 % (ref 42.7–76)
NOTE: ABNORMAL
OXYHGB MFR BLDV: 97.6 % (ref 94–99)
PCO2 BLDA: 41.9 MM HG (ref 35–45)
PCO2 TEMP ADJ BLD: 41.9 MM HG (ref 35–48)
PH BLDA: 7.43 PH UNITS (ref 7.35–7.45)
PH, TEMP CORRECTED: 7.43 PH UNITS
PLATELET # BLD AUTO: 211 10*3/MM3 (ref 140–450)
PMV BLD AUTO: 8 FL (ref 6–12)
PO2 BLDA: 100 MM HG (ref 83–108)
PO2 TEMP ADJ BLD: 100 MM HG (ref 83–108)
POTASSIUM SERPL-SCNC: 3.9 MMOL/L (ref 3.5–5.2)
PROT SERPL-MCNC: 7 G/DL (ref 6–8.5)
RBC # BLD AUTO: 6.14 10*6/MM3 (ref 4.14–5.8)
SODIUM SERPL-SCNC: 139 MMOL/L (ref 136–145)
TIBC SERPL-MCNC: 416 MCG/DL (ref 298–536)
TRANSFERRIN SERPL-MCNC: 279 MG/DL (ref 200–360)
VENTILATOR MODE: ABNORMAL
WBC NRBC COR # BLD: 6.5 10*3/MM3 (ref 3.4–10.8)

## 2021-11-22 PROCEDURE — 80053 COMPREHEN METABOLIC PANEL: CPT

## 2021-11-22 PROCEDURE — 83615 LACTATE (LD) (LDH) ENZYME: CPT

## 2021-11-22 PROCEDURE — 83540 ASSAY OF IRON: CPT

## 2021-11-22 PROCEDURE — 82668 ASSAY OF ERYTHROPOIETIN: CPT

## 2021-11-22 PROCEDURE — 82375 ASSAY CARBOXYHB QUANT: CPT

## 2021-11-22 PROCEDURE — 83050 HGB METHEMOGLOBIN QUAN: CPT

## 2021-11-22 PROCEDURE — 82728 ASSAY OF FERRITIN: CPT

## 2021-11-22 PROCEDURE — 82805 BLOOD GASES W/O2 SATURATION: CPT

## 2021-11-22 PROCEDURE — 99214 OFFICE O/P EST MOD 30 MIN: CPT | Performed by: INTERNAL MEDICINE

## 2021-11-22 PROCEDURE — 85025 COMPLETE CBC W/AUTO DIFF WBC: CPT

## 2021-11-22 PROCEDURE — 36415 COLL VENOUS BLD VENIPUNCTURE: CPT

## 2021-11-22 PROCEDURE — 36600 WITHDRAWAL OF ARTERIAL BLOOD: CPT

## 2021-11-22 PROCEDURE — 84466 ASSAY OF TRANSFERRIN: CPT

## 2021-11-22 NOTE — PROGRESS NOTES
Follow Up Office Visit      Date: 2021     Patient Name: Avi Mandel  MRN: [id number]  : 2000  Referring Physician: Andrea Ivory     Chief Complaint:  Follow-up for polycythemia     History of Present Illness: Avi Mandel is a pleasant 20 y.o. male past medical history of SLE, hypertension who presents today for evaluation of polycythemia.  Patient has been in his usual state of health when he had labs checked by his PCP was notable for an elevated hemoglobin of 18.7 in 2020.  Repeat in 2020 was 18.6.  Per chart review he has had an elevated hemoglobin to at least 16.9 in 2017.  Patient does note that he has been told he snores at night.  He is also been a heavy Juul smoker as well during this time.  He does not take any exogenous testosterone.  He denies any shortness of breath, pruritus after hot shower, redness of his face.  He had EPO level checked which was elevated to 19.6.  JAK2 V617F mutation was negative.  He otherwise feels well and has no major complaints today.    Interval History:  Presents clinic for follow-up.  Was lost to follow-up over the past year.  Has recently been seen by his PCP who checked a CBC which was notable for continued polycythemia.  Patient did not get his CT scan as ordered last visit.  He was seen by sleep medicine but did not have sleep testing completed although it appears to have been ordered per their notes.  He notes continued fatigue but denies any night sweats, fevers, unexplained weight loss    Oncology History:    Oncology/Hematology History    No history exists.       Subjective      Review of Systems:   Constitutional: Negative for fevers, chills, or weight loss  Eyes: Negative for blurred vision or discharge         Ear/Nose/Throat: Negative for difficulty swallowing, sore throat, LAD                                                       Respiratory: Negative for cough, SOA, wheezing                                     "                                                    Cardiovascular: Negative for chest pain or palpitations                                                                  Gastrointestinal: Negative for nausea, vomiting or diarrhea                                                                     Genitourinary: Negative for dysuria or hematuria                                                                                           Musculoskeletal: Negative for any joint pains or muscle aches                                                                        Neurologic: Negative for any weakness, headaches, dizziness                                                                         Hematologic: Negative for any easy bleeding or bruising                                                                                   Psychiatric: Negative for anxiety or depression                          Past Medical History/Past Surgical History/ Family History/ Social History: Reviewed by me and unchanged from my previous documentation done on November 2020.     Medications:     Current Outpatient Medications:   •  atomoxetine (Strattera) 40 MG capsule, Take 1 capsule by mouth Daily. The first week, Disp: 7 capsule, Rfl: 0  •  atomoxetine (Strattera) 80 MG capsule, Take 1 capsule by mouth Daily., Disp: 30 capsule, Rfl: 1  •  hydroxychloroquine (PLAQUENIL) 200 MG tablet, TAKE 2 TABLETS BY MOUTH EVERY DAY, Disp: , Rfl:   •  lisinopril (PRINIVIL,ZESTRIL) 20 MG tablet, Take 1 tablet by mouth Daily., Disp: 30 tablet, Rfl: 1    Allergies:   Allergies   Allergen Reactions   • No Known Drug Allergy        Objective     Physical Exam:  Vital Signs:   Vitals:    11/22/21 1454   BP: (!) 207/109   Pulse: 74   Resp: 16   Temp: 97.1 °F (36.2 °C)   TempSrc: Temporal   SpO2: 97%   Weight: 103 kg (226 lb)   Height: 188 cm (74.02\")   PainSc: 0-No pain     Pain Score    11/22/21 1454   PainSc: 0-No pain     ECOG Performance Status: 0 " - Asymptomatic    Constitutional: NAD, ECOG 0  Eyes: PERRLA, scleral anicteric  ENT: No LAD, no thyromegaly  Respiratory: CTAB, no wheezing, rales, rhonchi  Cardiovascular: RRR, no murmurs, pulses 2+ bilaterally  Abdomen: soft, NT/ND, no HSM  Musculoskeletal: strength 5/5 bilaterally, no c/c/e  Neurologic: A&O x 3, CN II-XII intact grossly    Results Review:   No visits with results within 2 Week(s) from this visit.   Latest known visit with results is:   Telephone on 10/20/2021   Component Date Value Ref Range Status   • JAK2 V617 Mutation Qnt Result 10/20/2021 Comment   Final    Comment: NEGATIVE  The JAK2 V617F mutation is not detected in the provided specimen of  this individual. This result does not rule out the presence of the  JAK2 mutation at a level below the sensitivity of detection of this  assay, or the presence of other mutations within JAK2 not detected by  this assay.     • Background 10/20/2021 Comment   Final    Comment: The Quantitative Real-Time PCR assay detects V617F mutation  (c.1849 G>T) observed in approximately 95% polycythemia vera (PV), 55%  essential thrombocythemia (ET) and 55% primary myelofibrosis (PMF). It  is also infrequently present (3-5%) in myelodysplastic syndrome,  chronic myelomonocytic leukemia, and other atypical chronic myeloid  disorders. The results should be interpreted in the context of all  clinical and laboratory findings. No therapeutic action should be  taken based solely on these results. This assay detects only the JAK2  V617F point mutation. Other mutations that may occur in the JAK2 gene  will not be detected.     • Methodology 10/20/2021 Comment   Final    Comment: This test was developed and its performance characteristics  determined by [facility]. It has not been cleared or approved  by the Food and Drug Administration.  Total genomic DNA was extracted and subjected to TaqMan real-time PCR  amplification/detection. Two amplification products per sample  were  monitored by real-time PCR using primers/probes specific to JAK2 wild  type (WT) and JAK2 mutant V617F. The [id number] Absolute Quantitation  software will compare the patient specimen values to the standard  curves and generate percent values for wild type and mutant type. The  numerical values of Sample Mutant Quantity/(Sample Mutant Quantity+  Sample Wild Type Quantity)X100 is reported as a percentage. In vitro  studies have indicated that this assay has an analytical sensitivity  of 1%.     • References 10/20/2021 Comment   Final    Comment:   Piter EJ, Lisandro LM, Yovany PJ, et al. Acquired mutation of the  tyrosine kinase JAK2 in human myeloproliferative disorders. Lancet.  2005 Mar 19-25; 365(7662):5598-6701.   Parker C, Jeferson V, Roya Cornejo MUSHTAQ. A unique clonal JAK2 mutation leading  to constitutive signaling causes polycythaemia vera. Nature. 2005 Apr 28; 355(4879):0311-7077.   Adonis R, Hiram F, Callie AS, et al. A gain-of-function  mutation of JAK2 in myeloproliferative disorders. N Engl J Med. 2005  Apr 28; 352(15):0029-2573.     • Director Review 10/20/2021 Comment   Final    Comment: Michael Will, PhD, [facility]      Director, Molecular Oncology      [facility] [place] [address]      [phone]     • Reflex 10/20/2021 Comment   Final    Comment: Reflex to CALR Mutation Analysis and MPL Mutation Analysis  is indicated.     • Erythropoietin 10/20/2021 19.0* 2.6 - 18.5 mIU/mL Final    Comment: [employer] DxI 800 Immunoassay System  Values obtained with different assay methods or kits cannot be used  interchangeably. Results cannot be interpreted as absolute evidence  of the presence or absence of malignant disease.     • WBC 10/20/2021 Appear normal.   Final   • RBC 10/20/2021 Comment   Final    Comment: Erythrocytosis. Further evaluation indicated to distinguish  between primary and secondary causes.     • Platelets 10/20/2021  Appear normal.   Final   • Comment 10/20/2021 Comment   Final   • Pathologist Review 10/20/2021 Comment   Final    Reviewed by: Geovanny Sargent MD, Pathologist   • WBC 10/20/2021 7.1  3.4 - 10.8 x10E3/uL Final   • RBC 10/20/2021 6.16* 4.14 - 5.80 x10E6/uL Final   • Hemoglobin 10/20/2021 17.9* 13.0 - 17.7 g/dL Final   • Hematocrit 10/20/2021 54.1* 37.5 - 51.0 % Final   • MCV 10/20/2021 88  79 - 97 fL Final   • MCH 10/20/2021 29.1  26.6 - 33.0 pg Final   • MCHC 10/20/2021 33.1  31.5 - 35.7 g/dL Final   • RDW 10/20/2021 13.4  11.6 - 15.4 % Final   • Platelets 10/20/2021 205  150 - 450 x10E3/uL Final   • Neutrophil Rel % 10/20/2021 73  Not Estab. % Final   • Lymphocyte Rel % 10/20/2021 18  Not Estab. % Final   • Monocyte Rel % 10/20/2021 7  Not Estab. % Final   • Eosinophil Rel % 10/20/2021 1  Not Estab. % Final   • Basophil Rel % 10/20/2021 1  Not Estab. % Final   • Neutrophils Absolute 10/20/2021 5.2  1.4 - 7.0 x10E3/uL Final   • Lymphocytes Absolute 10/20/2021 1.3  0.7 - 3.1 x10E3/uL Final   • Monocytes Absolute 10/20/2021 0.5  0.1 - 0.9 x10E3/uL Final   • Eosinophils Absolute 10/20/2021 0.1  0.0 - 0.4 x10E3/uL Final   • Basophils Absolute 10/20/2021 0.1  0.0 - 0.2 x10E3/uL Final   • Immature Granulocyte Rel % 10/20/2021 0  Not Estab. % Final   • Immature Grans Absolute 10/20/2021 0.0  0.0 - 0.1 x10E3/uL Final   • CALR Mutation Detection Result 10/20/2021 Comment   Final    Comment: NEGATIVE  No insertions or deletions were detected within the analyzed region  of the calreticulin (CALR) gene.  A negative result does not entirely exclude the possibility of a  clonal population carrying CALR gene mutations that are not covered  by this assay. Results should be interpreted in conjunction with  clinical and laboratory findings for the most accurate interpretation.     • Background: 10/20/2021 Comment   Final    Comment: The calcium-binding endoplasmic reticulin chaperone protein,  calreticulin (CALR), is somatically  mutated in approximately 70% of  patients with JAK2-negative essential thrombocythemia (ET) and 60-88%  of patients with JAK2-negative primary myelofibrosis(PMF). Only a  minority of patients (approximately 8%) with myelodysplasia have  mutations in  CALR gene. CALR mutations are rarely detected in  patients with de walter acute myeloid leukemia, chronic myelogenous  leukemia, lymphoid leukemia, or solid tumors. CALR mutations are not  detected in polycythemia and generally appear to be mutually exclusive  with JAK2 mutations and MPL mutations.  The majority of mutational changes involve a variety of insertion or  deletion mutations in exon 9 of the calreticulin gene: approximately  53% of all CALR mutations are a 52 bp deletion (type-1) while the  second most prevalent mutation (approximately 32%) contains a 5 bp  insertion (type-2). Other mutations (non-type 1 or type 2) are seen  in a sma                           ll minority of cases. CALR mutations in PMF tend to be  associated with a favorable prognosis compared to JAK2 V617F  mutations, whereas primary myelofibrosis negative for CALR, JAK2  V617F and MPL mutations (so-called triple negative) is associated  with a poor prognosis and shorter survival.  The detection of a CALR gene mutation aids in the specific diagnosis  of a myeloproliferative neoplasm, and help distinguish this clonal  disease from a benign reactive process.     • Methodology 10/20/2021 Comment   Final    Comment: Genomic DNA was isolated from the provided specimen. Polymerase chain  reaction (PCR) of exon 9 of the CALR gene was performed with specific  fluorescent-labeled primers, and the PCR product was analyzed by  capillary gel electrophoresis to determine the size of the PCR  products. This PCR assay is capable of detecting a mutant cell  population with a sensitivity of 5 mutant cells per 100 normal cells.  A negative result does not exclude the presence of a  myeloproliferative disorder  or other neoplastic process.  This test was developed and its performance characteristics determined  by [facility]. It has not been cleared or approved by the Food and Drug  Administration. The FDA has determined that such clearance or approval  is not necessary.     • Reference: 10/20/2021 Comment   Final    Comment: 1. REJI Bae et al. (2013) Somatic mutations of calreticulin in     myeloproliferative neoplasms. [place] Engl. J. Med. 369:4852-3163.  2. DEYA Best et al. (2013) Somatic CALR mutations in     myeloproliferative neoplasms with nonmutated JAK2. [place] Engl. J.     Med. 369:4747-2610.     • Director Review 10/20/2021 Comment   Final    Comment: Nelda Nunez MD, PhD  Director, Molecular Oncology  [facility] [place] [address]  [phone]     • MPL Mutation Analysis Result: 10/20/2021 Comment   Final    Comment: No MPL mutation was identified in the provided specimen of this  individual. Results should be interpreted in conjunction with  clinical and other laboratory findings for the most accurate  interpretation.     • Background 10/20/2021 Comment   Final    Comment: MPL (myeloproliferative leukemia virus oncogene homology) belongs to  the hematopoietin superfamily and enables its ligand thrombopoietin  to facilitate both global hematopoiesis and megakaryocyte growth and  differentiation. MPL W515 mutations are present in patients with  primary myelofibrosis (PMF) and essential thrombocythemia (ET) at a  frequency of approximately 5% and 1% respectively. The S505 mutation  is detected in patients with hereditary thrombocythemia.     • Method: 10/20/2021 Comment   Final    Comment: Genomic DNA was purified from the provided specimen. MPL gene region  covering the S505N and W515L/K mutations were subjected to PCR  amplification and bi-directional sequencing in duplicate to identify  sequence variations. This assay has a sensitivity to  detect  approximately 20-25% population of cells containing the MPL mutations  in a background of non-mutant cells. This assay will not detect the  mutation below the sensitivity of this assay. Molecular-based testing  is highly accurate, but as in any laboratory test, rare diagnostic  errors may occur.     • Reference 10/20/2021 Comment   Final    Comment: 1. Chidi MCNAMARA et al. (2006). VXL929 mutations in     myeloproliferative and other myeloid disorders: a study     of 1182 patients. Blood 108:9699-9358.  2. Jerry SCHULTZ and Ye CORDERO. (2008). JAK2 and MPL     mutations in myeloproliferative neoplasms: discovery and     science. Leukemia 22:2941-7691.  3. Vincent BEAL et al. (2009). Evidence for a  effect     of the MPL-S505N mutation in eight [place] pedigrees with     hereditary thrombocythemia. Haematologica 94(10):1368-     1374.     • Director Review: 10/20/2021 Comment   Final    Comment: Amla Clavin, PhD, [facility]     Genomics Associate , Clinical Cytogenetics     [facility]     [email or web address]R) [place] [address]     [phone]  This test was developed and its performance characteristics  determined by [facility]. It has not been cleared or approved  by the Food and Drug Administration.     • Extraction 10/20/2021 Comment   Final    This sample has been received and DNA extraction has been performed.       No results found.    Assessment / Plan      Assessment/Plan:   1. Polycythemia (Primary)  -Likely secondary to history of tobacco abuse, lupus nephritis, renal dysfunction  -Noted on recent labs.  Hemoglobin stable since November 2020  -JAK2 mutational testing negative  -Most recent EPO level 19  -Given elevated EPO level will reorder a CT abdomen/pelvis to rule out any renal lesion or renal arterial stenosis  -Previously has been seen by sleep  medicine but did not get the testing completed.  Advised patient to contact sleep medicine for further management  -We will check CBC, CMP, EPO, LDH, ferritin, iron studies, carboxyhemoglobin today     2.  Hypertension  -Blood pressure 207/109  -On lisinopril 20 mg per his PCP  -Currently asymptomatic at this time  -Advised patient to monitor his blood pressure at home and should remain elevated to contact his PCP for further medication changes  -Counseled that should he develop any significant headaches, vision changes, or chest pain that he should immediately go to the ER for further evaluation    3.  Lupus nephritis  -Complicates all aspects of care  -Has been seen by rheumatology but he has been lost to follow-up with him as well      Follow Up:   Follow-up in 3 weeks    Saad Spicer MD  Hematology and Oncology     Please note that portions of this note may have been completed with a voice recognition program. Efforts were made to edit the dictations, but occasionally words are mistranscribed.

## 2021-11-24 LAB — EPO SERPL-ACNC: 10.9 MIU/ML (ref 2.6–18.5)

## 2021-12-20 ENCOUNTER — HOSPITAL ENCOUNTER (OUTPATIENT)
Dept: ULTRASOUND IMAGING | Facility: HOSPITAL | Age: 21
End: 2021-12-20

## 2023-02-03 ENCOUNTER — OFFICE VISIT (OUTPATIENT)
Dept: FAMILY MEDICINE CLINIC | Facility: CLINIC | Age: 23
End: 2023-02-03
Payer: COMMERCIAL

## 2023-02-03 VITALS
HEART RATE: 78 BPM | BODY MASS INDEX: 30.32 KG/M2 | WEIGHT: 228.8 LBS | HEIGHT: 73 IN | DIASTOLIC BLOOD PRESSURE: 100 MMHG | TEMPERATURE: 98.4 F | RESPIRATION RATE: 20 BRPM | OXYGEN SATURATION: 99 % | SYSTOLIC BLOOD PRESSURE: 140 MMHG

## 2023-02-03 DIAGNOSIS — M32.9 SYSTEMIC LUPUS ERYTHEMATOSUS, UNSPECIFIED SLE TYPE, UNSPECIFIED ORGAN INVOLVEMENT STATUS: ICD-10-CM

## 2023-02-03 DIAGNOSIS — Z72.52 HIGH RISK HOMOSEXUAL BEHAVIOR: ICD-10-CM

## 2023-02-03 DIAGNOSIS — I15.1 HYPERTENSION SECONDARY TO OTHER RENAL DISORDERS: Primary | ICD-10-CM

## 2023-02-03 DIAGNOSIS — M32.14 STAGE IV LUPUS NEPHRITIS (WHO): ICD-10-CM

## 2023-02-03 DIAGNOSIS — N28.89 HYPERTENSION SECONDARY TO OTHER RENAL DISORDERS: Primary | ICD-10-CM

## 2023-02-03 DIAGNOSIS — Z13.220 SCREENING CHOLESTEROL LEVEL: ICD-10-CM

## 2023-02-03 DIAGNOSIS — N18.1 CKD (CHRONIC KIDNEY DISEASE) STAGE 1, GFR 90 ML/MIN OR GREATER: ICD-10-CM

## 2023-02-03 PROCEDURE — 99214 OFFICE O/P EST MOD 30 MIN: CPT | Performed by: FAMILY MEDICINE

## 2023-02-03 RX ORDER — LISINOPRIL 20 MG/1
20 TABLET ORAL DAILY
Qty: 30 TABLET | Refills: 1 | Status: SHIPPED | OUTPATIENT
Start: 2023-02-03

## 2023-02-03 NOTE — PROGRESS NOTES
"Chief Complaint   Patient presents with   • headache & nausea      On & off for the past 4 wks    • Exposure to STD     Pt would like order for labs        Subjective      Avi Mandel is a 22 y.o. who presents for multiple reasons.    Patient would like STI testing.  He has had 2 sexual partners in the last 3 months.  He does not consistently use condoms.  He denies symptoms of STI such as urethral discharge, fevers, chills, skin lesions.    Headache with nausea.  Has been present for 4 weeks.  Patient had similar symptoms a couple of years ago which ultimately were believed to be due to elevated blood pressure and underlying sleep apnea.    Hypertension.  Patient takes lisinopril to have a 7 days a week.  He has past diagnosis of lupus nephritis, systemic lupus along with his high blood pressure.  He needs new referrals for nephrology and rheumatology.    The following portions of the patient's history were reviewed and updated as appropriate: allergies, current medications, past family history, past medical history, past social history, past surgical history and problem list.    Review of Systems    Objective   Vital Signs:  /100   Pulse 78   Temp 98.4 °F (36.9 °C)   Resp 20   Ht 185.4 cm (73\")   Wt 104 kg (228 lb 12.8 oz)   SpO2 99%   BMI 30.19 kg/m²             Physical Exam     Result Review                     Assessment and Plan  Diagnoses and all orders for this visit:    1. Hypertension secondary to other renal disorders (Primary)  -     lisinopril (PRINIVIL,ZESTRIL) 20 MG tablet; Take 1 tablet by mouth Daily.  Dispense: 30 tablet; Refill: 1  -     CBC Auto Differential  -     Basic Metabolic Panel  -     Ambulatory Referral to Nephrology    2. Stage IV lupus nephritis (WHO) (HCC)  -     lisinopril (PRINIVIL,ZESTRIL) 20 MG tablet; Take 1 tablet by mouth Daily.  Dispense: 30 tablet; Refill: 1  -     CBC Auto Differential  -     Basic Metabolic Panel  -     Ambulatory Referral to " Nephrology    3. Systemic lupus erythematosus, unspecified SLE type, unspecified organ involvement status (HCC)  -     Ambulatory Referral to Nephrology  -     Ambulatory Referral to Rheumatology    4. High risk homosexual behavior  -     HIV-1/O/2 Ag/Ab w Reflex  -     Hepatitis C Antibody  -     Chlamydia trachomatis, Neisseria gonorrhoeae, PCR w/ confirmation - Urine, Urine, Clean Catch    5. CKD (chronic kidney disease) stage 1, GFR 90 ml/min or greater  -     lisinopril (PRINIVIL,ZESTRIL) 20 MG tablet; Take 1 tablet by mouth Daily.  Dispense: 30 tablet; Refill: 1  -     Basic Metabolic Panel    6. Screening cholesterol level  -     Lipid Panel    Plan  1.  New prescription for lisinopril 20 mg given.  I suspect blood pressure and underlying chronic diseases do have a role in his headaches.  2.  Regarding past history of sleep apnea he never completed the home sleep study and exhibits a pattern of noncompliance.  Home sleep study was not reordered today  3.  New referrals will be made to nephrology.  Patient will be referred to  nephrology as previous testing for his lupus nephritis was performed at   4.  Patient will also be referred to a rheumatologist affiliated with  as this is where initial diagnoses were made.  5.  STI tests ordered  6.  Screening cholesterol level ordered.        Follow Up  No follow-ups on file.  Patient was given instructions and counseling regarding his condition or for health maintenance advice. Please see specific information pulled into the AVS if appropriate.

## 2023-02-04 LAB
BASOPHILS # BLD AUTO: 0.1 X10E3/UL (ref 0–0.2)
BASOPHILS NFR BLD AUTO: 1 %
BUN SERPL-MCNC: 21 MG/DL (ref 6–20)
BUN/CREAT SERPL: 24 (ref 9–20)
CALCIUM SERPL-MCNC: 9.6 MG/DL (ref 8.7–10.2)
CHLORIDE SERPL-SCNC: 102 MMOL/L (ref 96–106)
CHOLEST SERPL-MCNC: 163 MG/DL (ref 100–199)
CO2 SERPL-SCNC: 26 MMOL/L (ref 20–29)
CREAT SERPL-MCNC: 0.88 MG/DL (ref 0.76–1.27)
EGFRCR SERPLBLD CKD-EPI 2021: 125 ML/MIN/1.73
EOSINOPHIL # BLD AUTO: 0.1 X10E3/UL (ref 0–0.4)
EOSINOPHIL NFR BLD AUTO: 2 %
ERYTHROCYTE [DISTWIDTH] IN BLOOD BY AUTOMATED COUNT: 13.4 % (ref 11.6–15.4)
GLUCOSE SERPL-MCNC: 82 MG/DL (ref 70–99)
HCT VFR BLD AUTO: 52.6 % (ref 37.5–51)
HCV AB S/CO SERPL IA: <0.1 S/CO RATIO (ref 0–0.9)
HDLC SERPL-MCNC: 58 MG/DL
HGB BLD-MCNC: 17.8 G/DL (ref 13–17.7)
HIV 1+2 AB+HIV1 P24 AG SERPL QL IA: NON REACTIVE
IMM GRANULOCYTES # BLD AUTO: 0 X10E3/UL (ref 0–0.1)
IMM GRANULOCYTES NFR BLD AUTO: 0 %
LDLC SERPL CALC-MCNC: 91 MG/DL (ref 0–99)
LYMPHOCYTES # BLD AUTO: 1.2 X10E3/UL (ref 0.7–3.1)
LYMPHOCYTES NFR BLD AUTO: 24 %
MCH RBC QN AUTO: 29 PG (ref 26.6–33)
MCHC RBC AUTO-ENTMCNC: 33.8 G/DL (ref 31.5–35.7)
MCV RBC AUTO: 86 FL (ref 79–97)
MONOCYTES # BLD AUTO: 0.4 X10E3/UL (ref 0.1–0.9)
MONOCYTES NFR BLD AUTO: 7 %
NEUTROPHILS # BLD AUTO: 3.4 X10E3/UL (ref 1.4–7)
NEUTROPHILS NFR BLD AUTO: 66 %
PLATELET # BLD AUTO: 212 X10E3/UL (ref 150–450)
POTASSIUM SERPL-SCNC: 4.1 MMOL/L (ref 3.5–5.2)
RBC # BLD AUTO: 6.14 X10E6/UL (ref 4.14–5.8)
SODIUM SERPL-SCNC: 145 MMOL/L (ref 134–144)
TRIGL SERPL-MCNC: 76 MG/DL (ref 0–149)
VLDLC SERPL CALC-MCNC: 14 MG/DL (ref 5–40)
WBC # BLD AUTO: 5.2 X10E3/UL (ref 3.4–10.8)

## 2023-02-10 LAB
C TRACH RRNA SPEC QL NAA+PROBE: POSITIVE
C TRACH RRNA SPEC QL NAA+PROBE: POSITIVE
N GONORRHOEA RRNA SPEC QL NAA+PROBE: NEGATIVE

## 2023-02-10 RX ORDER — DOXYCYCLINE HYCLATE 100 MG/1
100 CAPSULE ORAL 2 TIMES DAILY
Qty: 14 CAPSULE | Refills: 0 | Status: SHIPPED | OUTPATIENT
Start: 2023-02-10

## 2023-03-07 ENCOUNTER — OFFICE VISIT (OUTPATIENT)
Dept: FAMILY MEDICINE CLINIC | Facility: CLINIC | Age: 23
End: 2023-03-07
Payer: COMMERCIAL

## 2023-03-07 VITALS
OXYGEN SATURATION: 97 % | DIASTOLIC BLOOD PRESSURE: 124 MMHG | HEART RATE: 70 BPM | SYSTOLIC BLOOD PRESSURE: 208 MMHG | WEIGHT: 225.6 LBS | BODY MASS INDEX: 29.9 KG/M2 | RESPIRATION RATE: 12 BRPM | HEIGHT: 73 IN | TEMPERATURE: 97.5 F

## 2023-03-07 DIAGNOSIS — J06.9 ACUTE URI: Primary | ICD-10-CM

## 2023-03-07 DIAGNOSIS — K30 NONULCER DYSPEPSIA: ICD-10-CM

## 2023-03-07 DIAGNOSIS — R41.840 ATTENTION DEFICIT: ICD-10-CM

## 2023-03-07 PROCEDURE — 99213 OFFICE O/P EST LOW 20 MIN: CPT | Performed by: FAMILY MEDICINE

## 2023-03-07 RX ORDER — ATOMOXETINE 40 MG/1
40 CAPSULE ORAL DAILY
Qty: 7 CAPSULE | Refills: 0 | Status: SHIPPED | OUTPATIENT
Start: 2023-03-07

## 2023-03-07 RX ORDER — DIPHENOXYLATE HYDROCHLORIDE AND ATROPINE SULFATE 2.5; .025 MG/1; MG/1
1 TABLET ORAL DAILY
COMMUNITY

## 2023-03-07 RX ORDER — ATOMOXETINE 80 MG/1
80 CAPSULE ORAL DAILY
Qty: 30 CAPSULE | Refills: 1 | Status: SHIPPED | OUTPATIENT
Start: 2023-03-14

## 2023-03-07 NOTE — PROGRESS NOTES
"Chief Complaint   Patient presents with   • Fever     Sinus drainage, pressure in ears that lasted 1 day, swollen left lymph node that lasted 3 days, abdominal cramping, vomiting, diarrhea in beginning, fever around , headache, x 1 wk. No covid test.       Subjective      Avi Mandel is a 23 y.o. who presents for 1 week of illness that began with headache, postnasal drainage, fatigue followed by upset stomach.  Fever occurred and was as high as 101.  He has not had any fever in over 48 hours.  He denies cough, myalgias, chills.  Patient admits he is feeling better but has lingering stomach discomfort.  Patient is near the end of the course of doxycycline use to treat chlamydia.    Patient would also like to start Strattera.  This was prescribed to him 16 months ago but due to some insurance issues the prescription was never filled or picked up.  Patient is in school and he continues to have symptoms of difficulty focusing and maintaining concentration.  Patient's office note from November 9, 2021 was reviewed during the visit.        Objective   Vital Signs:  BP (!) 208/124   Pulse 70   Temp 97.5 °F (36.4 °C)   Resp 12   Ht 185.4 cm (73\")   Wt 102 kg (225 lb 9.6 oz)   SpO2 97%   BMI 29.76 kg/m²     Physical Exam  Constitutional:       Appearance: Normal appearance.   HENT:      Head: Normocephalic and atraumatic.      Right Ear: Tympanic membrane and ear canal normal.      Left Ear: Tympanic membrane and ear canal normal.      Nose: Nose normal.      Mouth/Throat:      Mouth: Mucous membranes are moist.      Pharynx: Oropharynx is clear.   Eyes:      Conjunctiva/sclera: Conjunctivae normal.   Cardiovascular:      Rate and Rhythm: Normal rate and regular rhythm.      Pulses: Normal pulses.      Heart sounds: Normal heart sounds. No murmur heard.  Pulmonary:      Effort: Pulmonary effort is normal. No respiratory distress.      Breath sounds: Normal breath sounds.   Abdominal:      General: Abdomen " is flat. Bowel sounds are normal. There is no distension.      Palpations: Abdomen is soft.      Tenderness: There is no abdominal tenderness.   Musculoskeletal:      Cervical back: Normal range of motion and neck supple. No tenderness.   Lymphadenopathy:      Cervical: No cervical adenopathy.   Skin:     General: Skin is warm and dry.   Neurological:      Mental Status: He is alert.   Psychiatric:         Mood and Affect: Mood normal.          Result Review                     Assessment and Plan  Diagnoses and all orders for this visit:    1. Acute URI (Primary)    2. Nonulcer dyspepsia    3. Attention deficit  -     atomoxetine (Strattera) 40 MG capsule; Take 1 capsule by mouth Daily. The first week  Dispense: 7 capsule; Refill: 0  -     atomoxetine (Strattera) 80 MG capsule; Take 1 capsule by mouth Daily.  Dispense: 30 capsule; Refill: 1    Plan  1.  Patient of viral illness that is resolving  2.  Patient's stomach discomfort is coming from his doxycycline which I have asked him to complete as he has 2 days worth of medication left  3.  Strattera was prescribed once again anymore weight to hear about the insurance coverage        Follow Up  No follow-ups on file.  Patient was given instructions and counseling regarding his condition or for health maintenance advice. Please see specific information pulled into the AVS if appropriate.

## 2023-03-08 ENCOUNTER — TELEPHONE (OUTPATIENT)
Dept: FAMILY MEDICINE CLINIC | Facility: CLINIC | Age: 23
End: 2023-03-08
Payer: COMMERCIAL

## 2023-03-08 NOTE — TELEPHONE ENCOUNTER
Submitted prior auth via Cover [employer] for Strattera 40mg for one week and then moves up to the 80mg dose.     Key#  [id number]

## 2023-06-08 ENCOUNTER — TELEPHONE (OUTPATIENT)
Dept: FAMILY MEDICINE CLINIC | Facility: CLINIC | Age: 23
End: 2023-06-08

## 2023-06-08 ENCOUNTER — READMISSION MANAGEMENT (OUTPATIENT)
Dept: CALL CENTER | Facility: HOSPITAL | Age: 23
End: 2023-06-08
Payer: COMMERCIAL

## 2023-06-08 NOTE — TELEPHONE ENCOUNTER
Caller: Avi Mandel    Relationship to patient: Self    Best call back number: [phone]     New or established patient?  [] New  [x] Established    Date of discharge: 06/07/2023    Facility discharged from: [facility]    Diagnosis/Symptoms: HIGH BLOOD PRESSURE    Length of stay (If applicable): 6/6/23-6/7/23

## 2023-06-08 NOTE — OUTREACH NOTE
Prep Survey      Flowsheet Row Responses   [facility] facility patient discharged from? Non-BH   Is LACE score < 7 ? Non-BH Discharge   Eligibility [facility]   Date of Discharge 06/07/23   Discharge diagnosis HTN   Does the patient have one of the following disease processes/diagnoses(primary or secondary)? Other   Prep survey completed? Yes            Kathi PHOENIX - Registered Nurse

## 2023-06-09 ENCOUNTER — TRANSITIONAL CARE MANAGEMENT TELEPHONE ENCOUNTER (OUTPATIENT)
Dept: CALL CENTER | Facility: HOSPITAL | Age: 23
End: 2023-06-09
Payer: COMMERCIAL

## 2023-06-09 NOTE — OUTREACH NOTE
Call Center TCM Note      Flowsheet Row Responses   [facility] patient discharged from? Non-BH   Does the patient have one of the following disease processes/diagnoses(primary or secondary)? Other   TCM attempt successful? No   Unsuccessful attempts Attempt 2            Nadine Fernando RN    6/9/2023, 16:19 EDT

## 2023-06-09 NOTE — OUTREACH NOTE
Call Center TCM Note      Flowsheet Row Responses   [facility] patient discharged from? Non-BH   Does the patient have one of the following disease processes/diagnoses(primary or secondary)? Other   TCM attempt successful? No   Unsuccessful attempts Attempt 1  [No updated verbal release on file from PCP group ]            Nadine Fernando RN    6/9/2023, 15:59 EDT

## 2023-06-12 ENCOUNTER — TRANSITIONAL CARE MANAGEMENT TELEPHONE ENCOUNTER (OUTPATIENT)
Dept: CALL CENTER | Facility: HOSPITAL | Age: 23
End: 2023-06-12
Payer: COMMERCIAL

## 2023-06-12 ENCOUNTER — OFFICE VISIT (OUTPATIENT)
Dept: FAMILY MEDICINE CLINIC | Facility: CLINIC | Age: 23
End: 2023-06-12
Payer: COMMERCIAL

## 2023-06-12 VITALS
OXYGEN SATURATION: 99 % | RESPIRATION RATE: 18 BRPM | SYSTOLIC BLOOD PRESSURE: 178 MMHG | HEART RATE: 88 BPM | DIASTOLIC BLOOD PRESSURE: 100 MMHG | WEIGHT: 209 LBS | TEMPERATURE: 98.4 F | BODY MASS INDEX: 27.57 KG/M2

## 2023-06-12 DIAGNOSIS — R41.840 ATTENTION DEFICIT: ICD-10-CM

## 2023-06-12 DIAGNOSIS — I15.1 HYPERTENSION SECONDARY TO OTHER RENAL DISORDERS: ICD-10-CM

## 2023-06-12 DIAGNOSIS — N28.89 HYPERTENSION SECONDARY TO OTHER RENAL DISORDERS: ICD-10-CM

## 2023-06-12 DIAGNOSIS — N18.1 CKD (CHRONIC KIDNEY DISEASE) STAGE 1, GFR 90 ML/MIN OR GREATER: ICD-10-CM

## 2023-06-12 DIAGNOSIS — M32.14 STAGE IV LUPUS NEPHRITIS (WHO): ICD-10-CM

## 2023-06-12 RX ORDER — LISINOPRIL 30 MG/1
30 TABLET ORAL DAILY
Qty: 30 TABLET | Refills: 5 | Status: SHIPPED | OUTPATIENT
Start: 2023-06-12

## 2023-06-12 RX ORDER — ATOMOXETINE 80 MG/1
80 CAPSULE ORAL DAILY
Qty: 30 CAPSULE | Refills: 5 | Status: SHIPPED | OUTPATIENT
Start: 2023-06-12

## 2023-06-12 NOTE — PROGRESS NOTES
Subjective   Avi Mandel is a 23 y.o. male.     History of Present Illness     He ran out of lisinopril while on vacation  Then had HA and felt more anxious and his BP was very high!  He does check his BP at home and the 20 mg works well when he is at home  He notes it is high when around doctors      He notes hat he did not get his strattera refilled recently  The 80 mg does help his focus quite a bit and is helping him at school and with work  Would like to get back on this medicine again      The following portions of the patient's history were reviewed and updated as appropriate: allergies, current medications, past family history, past medical history, past social history, past surgical history, and problem list.    Review of Systems   Constitutional: Negative.    Psychiatric/Behavioral: Negative.       Objective   Physical Exam  Vitals and nursing note reviewed.   Constitutional:       General: He is not in acute distress.     Appearance: Normal appearance. He is well-developed.   Cardiovascular:      Rate and Rhythm: Normal rate and regular rhythm.      Heart sounds: Normal heart sounds.   Pulmonary:      Effort: Pulmonary effort is normal.      Breath sounds: Normal breath sounds.   Neurological:      Mental Status: He is alert and oriented to person, place, and time.   Psychiatric:         Mood and Affect: Mood normal.         Behavior: Behavior normal.         Thought Content: Thought content normal.         Judgment: Judgment normal.       Assessment & Plan   Diagnoses and all orders for this visit:    1. CKD (chronic kidney disease) stage 1, GFR 90 ml/min or greater    2. Stage IV lupus nephritis (WHO)    3. Hypertension secondary to other renal disorders  -     lisinopril (PRINIVIL,ZESTRIL) 30 MG tablet; Take 1 tablet by mouth Daily.  Dispense: 30 tablet; Refill: 5    4. Attention deficit  -     atomoxetine (Strattera) 80 MG capsule; Take 1 capsule by mouth Daily.  Dispense: 30 capsule; Refill:  5    BP not controlled, will increase lisinopril form 20 to 30 and he will call back in a few weeks with BP update  Ok strattera 80, working well for attention

## 2023-06-12 NOTE — OUTREACH NOTE
Call Center TCM Note      Flowsheet Row Responses   [facility] facility patient discharged from? Non-  [[facility]]   Does the patient have one of the following disease processes/diagnoses(primary or secondary)? Other   TCM attempt successful? No   Unsuccessful attempts Attempt 3  [Outdated PCP verbal release]            Latosha Keith RN    6/12/2023, 08:34 EDT

## 2023-10-17 ENCOUNTER — OFFICE VISIT (OUTPATIENT)
Dept: FAMILY MEDICINE CLINIC | Facility: CLINIC | Age: 23
End: 2023-10-17
Payer: COMMERCIAL

## 2023-10-17 VITALS
SYSTOLIC BLOOD PRESSURE: 144 MMHG | DIASTOLIC BLOOD PRESSURE: 84 MMHG | OXYGEN SATURATION: 98 % | HEART RATE: 96 BPM | HEIGHT: 73 IN | BODY MASS INDEX: 27.17 KG/M2 | TEMPERATURE: 97.9 F | WEIGHT: 205 LBS

## 2023-10-17 DIAGNOSIS — D75.1 POLYCYTHEMIA: ICD-10-CM

## 2023-10-17 DIAGNOSIS — M32.14 STAGE IV LUPUS NEPHRITIS (WHO): ICD-10-CM

## 2023-10-17 DIAGNOSIS — I10 PRIMARY HYPERTENSION: Primary | ICD-10-CM

## 2023-10-17 DIAGNOSIS — I16.9 HYPERTENSIVE CRISIS: ICD-10-CM

## 2023-10-17 DIAGNOSIS — R41.840 ATTENTION DEFICIT: ICD-10-CM

## 2023-10-17 DIAGNOSIS — I15.1 HYPERTENSION SECONDARY TO OTHER RENAL DISORDERS: ICD-10-CM

## 2023-10-17 PROCEDURE — 99214 OFFICE O/P EST MOD 30 MIN: CPT | Performed by: FAMILY MEDICINE

## 2023-10-17 PROCEDURE — 1159F MED LIST DOCD IN RCRD: CPT | Performed by: FAMILY MEDICINE

## 2023-10-17 PROCEDURE — 3079F DIAST BP 80-89 MM HG: CPT | Performed by: FAMILY MEDICINE

## 2023-10-17 PROCEDURE — 1160F RVW MEDS BY RX/DR IN RCRD: CPT | Performed by: FAMILY MEDICINE

## 2023-10-17 PROCEDURE — 3077F SYST BP >= 140 MM HG: CPT | Performed by: FAMILY MEDICINE

## 2023-10-17 RX ORDER — LISINOPRIL 30 MG/1
30 TABLET ORAL DAILY
Qty: 30 TABLET | Refills: 5 | Status: SHIPPED | OUTPATIENT
Start: 2023-10-17

## 2023-10-17 RX ORDER — ATOMOXETINE 80 MG/1
80 CAPSULE ORAL DAILY
Qty: 30 CAPSULE | Refills: 5 | Status: SHIPPED | OUTPATIENT
Start: 2023-10-17

## 2023-10-17 NOTE — PROGRESS NOTES
Subjective   Avi Mandel is a 23 y.o. male.     History of Present Illness     He has been feeling light headed and dizzy when he stands up fast  Has been monitoring his BP at home and it has been doing well  Still taking lisinopril 30 mg      Focus has been doing ok on the strattera  It helps him in school  He wonders if his brain is not staying on task all the time  Stress  He is more anxious in general, just nervous a lot  When he is stressed than focus is worse      The following portions of the patient's history were reviewed and updated as appropriate: allergies, current medications, past family history, past medical history, past social history, past surgical history, and problem list.    Review of Systems   Constitutional: Negative.        Objective   Physical Exam  Vitals and nursing note reviewed.   Constitutional:       General: He is not in acute distress.     Appearance: Normal appearance. He is well-developed.   Cardiovascular:      Rate and Rhythm: Normal rate and regular rhythm.      Heart sounds: Normal heart sounds.   Pulmonary:      Effort: Pulmonary effort is normal.      Breath sounds: Normal breath sounds.   Neurological:      Mental Status: He is alert and oriented to person, place, and time.   Psychiatric:         Mood and Affect: Mood normal.         Behavior: Behavior normal.         Thought Content: Thought content normal.         Judgment: Judgment normal.         Assessment & Plan   Diagnoses and all orders for this visit:    1. Primary hypertension (Primary)  -     CBC & Differential  -     Comprehensive Metabolic Panel    2. Attention deficit  -     atomoxetine (Strattera) 80 MG capsule; Take 1 capsule by mouth Daily.  Dispense: 30 capsule; Refill: 5  -     Ambulatory Referral to Behavioral Health    3. Stage IV lupus nephritis (WHO)  -     Comprehensive Metabolic Panel  -     Ambulatory Referral to Rheumatology    4. Hypertensive crisis  -     Ambulatory Referral to  Cardiology    5. Polycythemia  -     CBC & Differential  -     Ambulatory Referral to Hematology    6. Hypertension secondary to other renal disorders  -     lisinopril (PRINIVIL,ZESTRIL) 30 MG tablet; Take 1 tablet by mouth Daily.  Dispense: 30 tablet; Refill: 5    BP absolutely not too low so will continue lisinopril 30.  Ok to f/u with cardiologist as hospital had recommended.  He will discuss light headed sensation with cardiology  Will work on rheum and hematologist again as pt has fallen out of touch with his specialists.  Referral placed  Continue strattera and ok psych for ongoing mood issus including anxiety

## 2023-10-18 LAB
ALBUMIN SERPL-MCNC: 4.8 G/DL (ref 4.3–5.2)
ALBUMIN/GLOB SERPL: 1.8 {RATIO} (ref 1.2–2.2)
ALP SERPL-CCNC: 62 IU/L (ref 44–121)
ALT SERPL-CCNC: 15 IU/L (ref 0–44)
AST SERPL-CCNC: 20 IU/L (ref 0–40)
BASOPHILS # BLD AUTO: 0.1 X10E3/UL (ref 0–0.2)
BASOPHILS NFR BLD AUTO: 1 %
BILIRUB SERPL-MCNC: 0.9 MG/DL (ref 0–1.2)
BUN SERPL-MCNC: 17 MG/DL (ref 6–20)
BUN/CREAT SERPL: 18 (ref 9–20)
CALCIUM SERPL-MCNC: 9.8 MG/DL (ref 8.7–10.2)
CHLORIDE SERPL-SCNC: 96 MMOL/L (ref 96–106)
CO2 SERPL-SCNC: 25 MMOL/L (ref 20–29)
CREAT SERPL-MCNC: 0.92 MG/DL (ref 0.76–1.27)
EGFRCR SERPLBLD CKD-EPI 2021: 120 ML/MIN/1.73
EOSINOPHIL # BLD AUTO: 0.1 X10E3/UL (ref 0–0.4)
EOSINOPHIL NFR BLD AUTO: 1 %
ERYTHROCYTE [DISTWIDTH] IN BLOOD BY AUTOMATED COUNT: 12.4 % (ref 11.6–15.4)
GLOBULIN SER CALC-MCNC: 2.6 G/DL (ref 1.5–4.5)
GLUCOSE SERPL-MCNC: 100 MG/DL (ref 70–99)
HCT VFR BLD AUTO: 53.7 % (ref 37.5–51)
HGB BLD-MCNC: 18.7 G/DL (ref 13–17.7)
IMM GRANULOCYTES # BLD AUTO: 0 X10E3/UL (ref 0–0.1)
IMM GRANULOCYTES NFR BLD AUTO: 0 %
LYMPHOCYTES # BLD AUTO: 1.3 X10E3/UL (ref 0.7–3.1)
LYMPHOCYTES NFR BLD AUTO: 19 %
MCH RBC QN AUTO: 30.1 PG (ref 26.6–33)
MCHC RBC AUTO-ENTMCNC: 34.8 G/DL (ref 31.5–35.7)
MCV RBC AUTO: 87 FL (ref 79–97)
MONOCYTES # BLD AUTO: 0.4 X10E3/UL (ref 0.1–0.9)
MONOCYTES NFR BLD AUTO: 7 %
NEUTROPHILS # BLD AUTO: 4.7 X10E3/UL (ref 1.4–7)
NEUTROPHILS NFR BLD AUTO: 72 %
PLATELET # BLD AUTO: 248 X10E3/UL (ref 150–450)
POTASSIUM SERPL-SCNC: 4.3 MMOL/L (ref 3.5–5.2)
PROT SERPL-MCNC: 7.4 G/DL (ref 6–8.5)
RBC # BLD AUTO: 6.21 X10E6/UL (ref 4.14–5.8)
SODIUM SERPL-SCNC: 137 MMOL/L (ref 134–144)
WBC # BLD AUTO: 6.6 X10E3/UL (ref 3.4–10.8)

## 2024-04-18 DIAGNOSIS — I15.1 HYPERTENSION SECONDARY TO OTHER RENAL DISORDERS: ICD-10-CM

## 2024-04-19 RX ORDER — LISINOPRIL 30 MG/1
30 TABLET ORAL DAILY
Qty: 30 TABLET | Refills: 5 | Status: SHIPPED | OUTPATIENT
Start: 2024-04-19 | End: 2024-04-22 | Stop reason: SDUPTHER

## 2024-04-22 ENCOUNTER — OFFICE VISIT (OUTPATIENT)
Dept: FAMILY MEDICINE CLINIC | Facility: CLINIC | Age: 24
End: 2024-04-22
Payer: COMMERCIAL

## 2024-04-22 VITALS
HEART RATE: 97 BPM | TEMPERATURE: 98.4 F | OXYGEN SATURATION: 95 % | RESPIRATION RATE: 16 BRPM | DIASTOLIC BLOOD PRESSURE: 82 MMHG | BODY MASS INDEX: 26.9 KG/M2 | SYSTOLIC BLOOD PRESSURE: 140 MMHG | WEIGHT: 203 LBS | HEIGHT: 73 IN

## 2024-04-22 DIAGNOSIS — R41.840 ATTENTION DEFICIT: ICD-10-CM

## 2024-04-22 DIAGNOSIS — D75.1 POLYCYTHEMIA: ICD-10-CM

## 2024-04-22 DIAGNOSIS — I15.1 HYPERTENSION SECONDARY TO OTHER RENAL DISORDERS: Primary | ICD-10-CM

## 2024-04-22 DIAGNOSIS — T78.1XXA GASTROINTESTINAL FOOD SENSITIVITY: ICD-10-CM

## 2024-04-22 PROCEDURE — 99214 OFFICE O/P EST MOD 30 MIN: CPT | Performed by: FAMILY MEDICINE

## 2024-04-22 PROCEDURE — 1160F RVW MEDS BY RX/DR IN RCRD: CPT | Performed by: FAMILY MEDICINE

## 2024-04-22 PROCEDURE — 3079F DIAST BP 80-89 MM HG: CPT | Performed by: FAMILY MEDICINE

## 2024-04-22 PROCEDURE — 3077F SYST BP >= 140 MM HG: CPT | Performed by: FAMILY MEDICINE

## 2024-04-22 PROCEDURE — 1159F MED LIST DOCD IN RCRD: CPT | Performed by: FAMILY MEDICINE

## 2024-04-22 RX ORDER — LISINOPRIL 30 MG/1
30 TABLET ORAL DAILY
Qty: 30 TABLET | Refills: 5 | Status: SHIPPED | OUTPATIENT
Start: 2024-04-22

## 2024-04-22 NOTE — PROGRESS NOTES
Subjective   Avi Mandel is a 24 y.o. male.     Hypertension       He notes that he is checking his BP at home and it is normally doing well at home  However it is VERY high here today  I rechekced it and it was 140/82      He is worried about a gluten allergy as well  Just has occasional stomach upset with heavy gluten and will feel tired      He has been on strattera for a while  This does help him with school and other things that need focus  However, he feels it could make him irritable and short tempered  This is more evident at work and while driving and with stress  Also less interested in normal conversations      Review of Systems   Constitutional: Negative.    Psychiatric/Behavioral: Negative.         Objective   Physical Exam  Vitals and nursing note reviewed.   Constitutional:       General: He is not in acute distress.     Appearance: Normal appearance. He is well-developed.   Cardiovascular:      Rate and Rhythm: Normal rate and regular rhythm.      Heart sounds: Normal heart sounds.   Pulmonary:      Effort: Pulmonary effort is normal.      Breath sounds: Normal breath sounds.   Neurological:      Mental Status: He is alert and oriented to person, place, and time.   Psychiatric:         Mood and Affect: Mood normal.         Behavior: Behavior normal.         Thought Content: Thought content normal.         Judgment: Judgment normal.       Assessment & Plan   Diagnoses and all orders for this visit:    1. Hypertension secondary to other renal disorders (Primary)  -     CBC & Differential  -     Comprehensive Metabolic Panel  -     lisinopril (PRINIVIL,ZESTRIL) 30 MG tablet; Take 1 tablet by mouth Daily.  Dispense: 30 tablet; Refill: 5    2. Attention deficit  -     Ambulatory Referral to Behavioral Health    3. Polycythemia  -     CBC & Differential    4. Gastrointestinal food sensitivity  -     Celiac Comprehensive Panel  -     Allergen Food Panel      I had to recheck his BP today and got 140/82  today.  Discussed increase in lisinopril but he has had lows with that dose in the past.  Will continue to monitor and see if changes needed in future.  He asks for stimulants for his ADHD.  Due to age, will refer to mental health  Will recheck his CBC, has had polycythemia in the past    He notes food causes GI issues, will check food allergy panel and celiac panel

## 2024-04-23 ENCOUNTER — TELEPHONE (OUTPATIENT)
Dept: BEHAVIORAL HEALTH | Facility: CLINIC | Age: 24
End: 2024-04-23
Payer: COMMERCIAL

## 2024-04-23 NOTE — TELEPHONE ENCOUNTER
Pt called stating he was referred by Dr Ivory to [facility] for ADHD treatment. Scheduled initial consult w/ Ronak Kiran for May 30th.

## 2024-04-29 LAB
ALBUMIN SERPL-MCNC: 4.5 G/DL (ref 4.3–5.2)
ALBUMIN/GLOB SERPL: 1.7 {RATIO} (ref 1.2–2.2)
ALP SERPL-CCNC: 67 IU/L (ref 44–121)
ALT SERPL-CCNC: 16 IU/L (ref 0–44)
AST SERPL-CCNC: 21 IU/L (ref 0–40)
BARLEY IGE QN: <0.1 KU/L
BASOPHILS # BLD AUTO: 0.1 X10E3/UL (ref 0–0.2)
BASOPHILS NFR BLD AUTO: 1 %
BEEF IGE QN: <0.1 KU/L
BILIRUB SERPL-MCNC: 0.5 MG/DL (ref 0–1.2)
BUN SERPL-MCNC: 23 MG/DL (ref 6–20)
BUN/CREAT SERPL: 22 (ref 9–20)
CABBAGE IGE QN: <0.1 KU/L
CALCIUM SERPL-MCNC: 9.8 MG/DL (ref 8.7–10.2)
CARROT IGE QN: <0.1 KU/L
CHICKEN MEAT IGE QN: <0.1 KU/L
CHLORIDE SERPL-SCNC: 101 MMOL/L (ref 96–106)
CO2 SERPL-SCNC: 26 MMOL/L (ref 20–29)
CODFISH IGE QN: <0.1 KU/L
CONV CLASS DESCRIPTION: ABNORMAL
CORN IGE QN: <0.1 KU/L
COW MILK IGE QN: <0.1 KU/L
CRAB IGE QN: <0.1 KU/L
CREAT SERPL-MCNC: 1.06 MG/DL (ref 0.76–1.27)
EGFRCR SERPLBLD CKD-EPI 2021: 101 ML/MIN/1.73
EGG WHITE IGE QN: <0.1 KU/L
ENDOMYSIUM IGA SER QL: NEGATIVE
EOSINOPHIL # BLD AUTO: 0.1 X10E3/UL (ref 0–0.4)
EOSINOPHIL NFR BLD AUTO: 2 %
ERYTHROCYTE [DISTWIDTH] IN BLOOD BY AUTOMATED COUNT: 13 % (ref 11.6–15.4)
GLIADIN PEPTIDE IGA SER-ACNC: 3 UNITS (ref 0–19)
GLIADIN PEPTIDE IGG SER-ACNC: 3 UNITS (ref 0–19)
GLOBULIN SER CALC-MCNC: 2.7 G/DL (ref 1.5–4.5)
GLUCOSE SERPL-MCNC: 88 MG/DL (ref 70–99)
GRAPE IGE QN: <0.1 KU/L
GREEN PEPPER IGE QN: <0.1 KU/L
HCT VFR BLD AUTO: 52.3 % (ref 37.5–51)
HGB BLD-MCNC: 17.8 G/DL (ref 13–17.7)
IGA SERPL-MCNC: 262 MG/DL (ref 90–386)
IMM GRANULOCYTES # BLD AUTO: 0 X10E3/UL (ref 0–0.1)
IMM GRANULOCYTES NFR BLD AUTO: 0 %
LETTUCE IGE QN: <0.1 KU/L
LYMPHOCYTES # BLD AUTO: 1.2 X10E3/UL (ref 0.7–3.1)
LYMPHOCYTES NFR BLD AUTO: 27 %
MCH RBC QN AUTO: 30.3 PG (ref 26.6–33)
MCHC RBC AUTO-ENTMCNC: 34 G/DL (ref 31.5–35.7)
MCV RBC AUTO: 89 FL (ref 79–97)
MONOCYTES # BLD AUTO: 0.4 X10E3/UL (ref 0.1–0.9)
MONOCYTES NFR BLD AUTO: 10 %
NEUTROPHILS # BLD AUTO: 2.6 X10E3/UL (ref 1.4–7)
NEUTROPHILS NFR BLD AUTO: 60 %
OAT IGE QN: <0.1 KU/L
ORANGE IGE QN: <0.1 KU/L
PEANUT IGE QN: <0.1 KU/L
PLATELET # BLD AUTO: 266 X10E3/UL (ref 150–450)
PORK IGE QN: <0.1 KU/L
POTASSIUM SERPL-SCNC: 5.1 MMOL/L (ref 3.5–5.2)
POTATO IGE QN: <0.1 KU/L
PROT SERPL-MCNC: 7.2 G/DL (ref 6–8.5)
RBC # BLD AUTO: 5.87 X10E6/UL (ref 4.14–5.8)
RICE IGE QN: <0.1 KU/L
RYE IGE QN: <0.1 KU/L
SHRIMP IGE QN: 0.15 KU/L
SODIUM SERPL-SCNC: 139 MMOL/L (ref 134–144)
SOYBEAN IGE QN: <0.1 KU/L
TOMATO IGE QN: <0.1 KU/L
TTG IGA SER-ACNC: <2 U/ML (ref 0–3)
TTG IGG SER-ACNC: <2 U/ML (ref 0–5)
TUNA IGE QN: <0.1 KU/L
WBC # BLD AUTO: 4.3 X10E3/UL (ref 3.4–10.8)
WHEAT IGE QN: <0.1 KU/L
WHITE BEAN IGE QN: <0.1 KU/L

## 2024-06-05 DIAGNOSIS — R41.840 ATTENTION DEFICIT: ICD-10-CM

## 2024-06-06 RX ORDER — ATOMOXETINE 80 MG/1
80 CAPSULE ORAL DAILY
Qty: 90 CAPSULE | OUTPATIENT
Start: 2024-06-06

## 2024-07-02 DIAGNOSIS — R41.840 ATTENTION DEFICIT: ICD-10-CM

## 2024-07-02 RX ORDER — ATOMOXETINE 80 MG/1
80 CAPSULE ORAL DAILY
Qty: 90 CAPSULE | OUTPATIENT
Start: 2024-07-02

## 2024-08-07 DIAGNOSIS — R41.840 ATTENTION DEFICIT: ICD-10-CM

## 2024-08-07 RX ORDER — ATOMOXETINE 80 MG/1
80 CAPSULE ORAL DAILY
Qty: 30 CAPSULE | Refills: 5 | OUTPATIENT
Start: 2024-08-07

## 2024-08-07 NOTE — TELEPHONE ENCOUNTER
Caller: Avi Mandel    Relationship: Self    Best call back number: [phone]     Requested Prescriptions:   Requested Prescriptions     Pending Prescriptions Disp Refills    atomoxetine (Strattera) 80 MG capsule 30 capsule 5     Sig: Take 1 capsule by mouth Daily.      Pharmacy where request should be sent: [facility] #[id number] - [address] [phone] [facility] [phone]      Last office visit with prescribing clinician: 4/22/2024   Last telemedicine visit with prescribing clinician: Visit date not found   Next office visit with prescribing clinician: 10/22/2024     Additional details provided by patient: PATIENT HAS 2 DAYS REMAINING    Does the patient have less than a 3 day supply:  [x] Yes  [] No    Would you like a call back once the refill request has been completed: [] Yes [x] No    If the office needs to give you a call back, can they leave a voicemail: [] Yes [x] No    Martha Hanley Rep   08/07/24 12:42 EDT

## 2024-08-10 DIAGNOSIS — R41.840 ATTENTION DEFICIT: ICD-10-CM

## 2024-08-12 RX ORDER — ATOMOXETINE 80 MG/1
80 CAPSULE ORAL DAILY
Qty: 90 CAPSULE | OUTPATIENT
Start: 2024-08-12

## 2024-08-20 DIAGNOSIS — R41.840 ATTENTION DEFICIT: ICD-10-CM

## 2024-08-21 RX ORDER — ATOMOXETINE 80 MG/1
80 CAPSULE ORAL DAILY
Qty: 90 CAPSULE | OUTPATIENT
Start: 2024-08-21

## 2024-08-27 ENCOUNTER — TELEPHONE (OUTPATIENT)
Dept: FAMILY MEDICINE CLINIC | Facility: CLINIC | Age: 24
End: 2024-08-27

## 2024-10-22 ENCOUNTER — OFFICE VISIT (OUTPATIENT)
Dept: FAMILY MEDICINE CLINIC | Facility: CLINIC | Age: 24
End: 2024-10-22
Payer: COMMERCIAL

## 2024-10-22 VITALS
BODY MASS INDEX: 30.09 KG/M2 | DIASTOLIC BLOOD PRESSURE: 72 MMHG | TEMPERATURE: 97.7 F | WEIGHT: 227 LBS | HEART RATE: 79 BPM | RESPIRATION RATE: 16 BRPM | OXYGEN SATURATION: 98 % | SYSTOLIC BLOOD PRESSURE: 138 MMHG | HEIGHT: 73 IN

## 2024-10-22 DIAGNOSIS — F41.1 GENERALIZED ANXIETY DISORDER: ICD-10-CM

## 2024-10-22 DIAGNOSIS — I15.1 HYPERTENSION SECONDARY TO OTHER RENAL DISORDERS: Primary | ICD-10-CM

## 2024-10-22 DIAGNOSIS — D75.1 POLYCYTHEMIA: ICD-10-CM

## 2024-10-22 DIAGNOSIS — R41.840 ATTENTION DEFICIT: ICD-10-CM

## 2024-10-22 PROCEDURE — 1159F MED LIST DOCD IN RCRD: CPT | Performed by: FAMILY MEDICINE

## 2024-10-22 PROCEDURE — 99214 OFFICE O/P EST MOD 30 MIN: CPT | Performed by: FAMILY MEDICINE

## 2024-10-22 PROCEDURE — 1160F RVW MEDS BY RX/DR IN RCRD: CPT | Performed by: FAMILY MEDICINE

## 2024-10-22 PROCEDURE — 1126F AMNT PAIN NOTED NONE PRSNT: CPT | Performed by: FAMILY MEDICINE

## 2024-10-22 PROCEDURE — 90656 IIV3 VACC NO PRSV 0.5 ML IM: CPT | Performed by: FAMILY MEDICINE

## 2024-10-22 PROCEDURE — 3078F DIAST BP <80 MM HG: CPT | Performed by: FAMILY MEDICINE

## 2024-10-22 PROCEDURE — 3075F SYST BP GE 130 - 139MM HG: CPT | Performed by: FAMILY MEDICINE

## 2024-10-22 PROCEDURE — 90471 IMMUNIZATION ADMIN: CPT | Performed by: FAMILY MEDICINE

## 2024-10-22 RX ORDER — ATOMOXETINE 80 MG/1
80 CAPSULE ORAL DAILY
Qty: 30 CAPSULE | Refills: 5 | Status: SHIPPED | OUTPATIENT
Start: 2024-10-22

## 2024-10-22 RX ORDER — LISINOPRIL 30 MG/1
30 TABLET ORAL DAILY
Qty: 30 TABLET | Refills: 5 | Status: SHIPPED | OUTPATIENT
Start: 2024-10-22

## 2024-10-22 RX ORDER — ESCITALOPRAM OXALATE 10 MG/1
10 TABLET ORAL DAILY
Qty: 30 TABLET | Refills: 2 | Status: SHIPPED | OUTPATIENT
Start: 2024-10-22

## 2024-10-22 RX ORDER — ATOMOXETINE 40 MG/1
40 CAPSULE ORAL DAILY
Qty: 7 CAPSULE | Refills: 0 | Status: SHIPPED | OUTPATIENT
Start: 2024-10-22

## 2024-10-22 NOTE — PROGRESS NOTES
Subjective   Avi Mandel is a 24 y.o. male.     History of Present Illness     He has not been on his ADHD medicine  He would like to see behavioral health due to insurance issues  He would like to see Ronak but is not sure what happened at 5/30/24 appointment  He would like to get back on the strattera if covered      He has been more stressed  Mind worrying more and more about things  This is bothersome        Avi Mandel  is here for follow-up of hypertension of 2 years duration. He is not exercising and is not adherent to a low-salt diet. Patient does not check his blood pressure.  Cardiovascular risk factors: hypertension. He is compliant with meds.      The following portions of the patient's history were reviewed and updated as appropriate: allergies, current medications, past family history, past medical history, past social history, past surgical history, and problem list.    Review of Systems   Constitutional: Negative.    Psychiatric/Behavioral: Negative.         Objective   Physical Exam  Vitals and nursing note reviewed.   Constitutional:       General: He is not in acute distress.     Appearance: Normal appearance. He is well-developed.   Cardiovascular:      Rate and Rhythm: Normal rate and regular rhythm.      Heart sounds: Normal heart sounds.   Pulmonary:      Effort: Pulmonary effort is normal.      Breath sounds: Normal breath sounds.   Neurological:      Mental Status: He is alert and oriented to person, place, and time.   Psychiatric:         Mood and Affect: Mood normal.         Behavior: Behavior normal.         Thought Content: Thought content normal.         Judgment: Judgment normal.         Assessment & Plan   Diagnoses and all orders for this visit:    1. Hypertension secondary to other renal disorders (Primary)  -     CBC & Differential  -     Comprehensive Metabolic Panel  -     lisinopril (PRINIVIL,ZESTRIL) 30 MG tablet; Take 1 tablet by mouth Daily.  Dispense: 30 tablet;  Refill: 5    2. Attention deficit  -     Ambulatory Referral to Behavioral Health  -     atomoxetine (Strattera) 40 MG capsule; Take 1 capsule by mouth Daily. The first week  Dispense: 7 capsule; Refill: 0  -     atomoxetine (Strattera) 80 MG capsule; Take 1 capsule by mouth Daily.  Dispense: 30 capsule; Refill: 5    3. Polycythemia  -     CBC & Differential    4. Generalized anxiety disorder  -     escitalopram (Lexapro) 10 MG tablet; Take 1 tablet by mouth Daily.  Dispense: 30 tablet; Refill: 2    Other orders  -     Fluzone >6mos ([id number])    Will continue lisinopril and recheck BP in future  Resume strattera, this does help focus.  Will use lexapro for anxiety.  Pros/cons/SE discussed with pt

## 2024-10-23 LAB
ALBUMIN SERPL-MCNC: 4.3 G/DL (ref 3.5–5.2)
ALBUMIN/GLOB SERPL: 1.7 G/DL
ALP SERPL-CCNC: 70 U/L (ref 39–117)
ALT SERPL-CCNC: 20 U/L (ref 1–41)
AST SERPL-CCNC: 23 U/L (ref 1–40)
BASOPHILS # BLD AUTO: 0.06 10*3/MM3 (ref 0–0.2)
BASOPHILS NFR BLD AUTO: 1.1 % (ref 0–1.5)
BILIRUB SERPL-MCNC: 0.4 MG/DL (ref 0–1.2)
BUN SERPL-MCNC: 24 MG/DL (ref 6–20)
BUN/CREAT SERPL: 22.9 (ref 7–25)
CALCIUM SERPL-MCNC: 9.3 MG/DL (ref 8.6–10.5)
CHLORIDE SERPL-SCNC: 101 MMOL/L (ref 98–107)
CO2 SERPL-SCNC: 25.4 MMOL/L (ref 22–29)
CREAT SERPL-MCNC: 1.05 MG/DL (ref 0.76–1.27)
EGFRCR SERPLBLD CKD-EPI 2021: 101.7 ML/MIN/1.73
EOSINOPHIL # BLD AUTO: 0.13 10*3/MM3 (ref 0–0.4)
EOSINOPHIL NFR BLD AUTO: 2.4 % (ref 0.3–6.2)
ERYTHROCYTE [DISTWIDTH] IN BLOOD BY AUTOMATED COUNT: 12.9 % (ref 12.3–15.4)
GLOBULIN SER CALC-MCNC: 2.6 GM/DL
GLUCOSE SERPL-MCNC: 89 MG/DL (ref 65–99)
HCT VFR BLD AUTO: 54.7 % (ref 37.5–51)
HGB BLD-MCNC: 18.7 G/DL (ref 13–17.7)
IMM GRANULOCYTES # BLD AUTO: 0.01 10*3/MM3 (ref 0–0.05)
IMM GRANULOCYTES NFR BLD AUTO: 0.2 % (ref 0–0.5)
LYMPHOCYTES # BLD AUTO: 1.35 10*3/MM3 (ref 0.7–3.1)
LYMPHOCYTES NFR BLD AUTO: 24.6 % (ref 19.6–45.3)
MCH RBC QN AUTO: 30.3 PG (ref 26.6–33)
MCHC RBC AUTO-ENTMCNC: 34.2 G/DL (ref 31.5–35.7)
MCV RBC AUTO: 88.7 FL (ref 79–97)
MONOCYTES # BLD AUTO: 0.44 10*3/MM3 (ref 0.1–0.9)
MONOCYTES NFR BLD AUTO: 8 % (ref 5–12)
NEUTROPHILS # BLD AUTO: 3.49 10*3/MM3 (ref 1.7–7)
NEUTROPHILS NFR BLD AUTO: 63.7 % (ref 42.7–76)
NRBC BLD AUTO-RTO: 0 /100 WBC (ref 0–0.2)
PLATELET # BLD AUTO: 230 10*3/MM3 (ref 140–450)
POTASSIUM SERPL-SCNC: 4.4 MMOL/L (ref 3.5–5.2)
PROT SERPL-MCNC: 6.9 G/DL (ref 6–8.5)
RBC # BLD AUTO: 6.17 10*6/MM3 (ref 4.14–5.8)
SODIUM SERPL-SCNC: 137 MMOL/L (ref 136–145)
WBC # BLD AUTO: 5.48 10*3/MM3 (ref 3.4–10.8)

## 2024-10-24 ENCOUNTER — TELEPHONE (OUTPATIENT)
Dept: FAMILY MEDICINE CLINIC | Facility: CLINIC | Age: 24
End: 2024-10-24
Payer: COMMERCIAL

## 2024-11-01 DIAGNOSIS — R41.840 ATTENTION DEFICIT: ICD-10-CM

## 2024-11-04 RX ORDER — ATOMOXETINE 40 MG/1
CAPSULE ORAL
Qty: 7 CAPSULE | Refills: 0 | OUTPATIENT
Start: 2024-11-04

## 2025-01-21 DIAGNOSIS — F41.1 GENERALIZED ANXIETY DISORDER: ICD-10-CM

## 2025-01-21 NOTE — TELEPHONE ENCOUNTER
PHARMACY SENT OVER REQUEST A FEW MINUTES AGO    Caller: Avi Mandel    Relationship: Self    Best call back number: [phone]    Requested Prescriptions:   Requested Prescriptions     Pending Prescriptions Disp Refills    escitalopram (LEXAPRO) 10 MG tablet [Pharmacy Med Name: ESCITALOPRAM 10MG TABLETS] 30 tablet 2     Sig: TAKE 1 TABLET BY MOUTH DAILY        Pharmacy where request should be sent: [facility] #[id number] - [address] [phone] [facility] [phone]      Last office visit with prescribing clinician: 10/22/2024   Last telemedicine visit with prescribing clinician: Visit date not found   Next office visit with prescribing clinician: 4/23/2025     Additional details provided by patient: OUT OF MEDICATION     Does the patient have less than a 3 day supply:  [x] Yes  [] No       Eleanor Catherine MA   01/21/25 14:45 EST

## 2025-01-22 ENCOUNTER — OFFICE VISIT (OUTPATIENT)
Dept: BEHAVIORAL HEALTH | Facility: CLINIC | Age: 25
End: 2025-01-22
Payer: COMMERCIAL

## 2025-01-22 DIAGNOSIS — F43.23 ADJUSTMENT DISORDER WITH MIXED ANXIETY AND DEPRESSED MOOD: Primary | ICD-10-CM

## 2025-01-22 RX ORDER — ESCITALOPRAM OXALATE 10 MG/1
10 TABLET ORAL DAILY
Qty: 30 TABLET | Refills: 4 | Status: SHIPPED | OUTPATIENT
Start: 2025-01-22
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
## 2025-02-01 DIAGNOSIS — R41.840 ATTENTION DEFICIT: ICD-10-CM

## 2025-02-01 RX ORDER — ATOMOXETINE 80 MG/1
80 CAPSULE ORAL DAILY
Qty: 30 CAPSULE | Refills: 5 | Status: CANCELLED | OUTPATIENT
Start: 2025-02-01

## 2025-03-27 ENCOUNTER — OFFICE VISIT (OUTPATIENT)
Dept: BEHAVIORAL HEALTH | Facility: CLINIC | Age: 25
End: 2025-03-27
Payer: COMMERCIAL

## 2025-03-27 DIAGNOSIS — F43.23 ADJUSTMENT DISORDER WITH MIXED ANXIETY AND DEPRESSED MOOD: Primary | ICD-10-CM

## 2025-03-27 NOTE — PROGRESS NOTES
[facility]                  Follow Up Adult      Follow Up Adult Note     Date:2025   Patient Name: Avi Mandel  : 2000   MRN: [id number]   Time IN: 2:00 PM    Time OUT: 2:45 PM     Referring Provider: Andrea Ivory MD    Chief Complaint:      ICD-10-CM ICD-9-CM   1. Adjustment disorder with mixed anxiety and depressed mood  F43.23 309.28      History of Present Illness:   Avi Mandel is a 25 y.o. male who is being seen today for follow up individual Psychotherapy session.      Subjective     Patient's Support Network Includes:  extended family and friends    Functional Status: Mild impairment     Progress toward goal: Not at goal    Prognosis: Good with Ongoing Treatment     Medications:     Current Outpatient Medications:     atomoxetine (Strattera) 40 MG capsule, Take 1 capsule by mouth Daily. The first week, Disp: 7 capsule, Rfl: 0    atomoxetine (Strattera) 80 MG capsule, Take 1 capsule by mouth Daily., Disp: 30 capsule, Rfl: 5    escitalopram (LEXAPRO) 10 MG tablet, TAKE 1 TABLET BY MOUTH DAILY, Disp: 30 tablet, Rfl: 4    hydroxychloroquine (PLAQUENIL) 200 MG tablet, TAKE 2 TABLETS BY MOUTH EVERY DAY, Disp: , Rfl:     lisinopril (PRINIVIL,ZESTRIL) 30 MG tablet, Take 1 tablet by mouth Daily., Disp: 30 tablet, Rfl: 5    multivitamin (THERAGRAN) tablet tablet, Take 1 tablet by mouth Daily., Disp: , Rfl:     Allergies:   Allergies   Allergen Reactions    No Known Drug Allergy        Objective     Mental Status Exam:   Hygiene:   good  Cooperation:  Cooperative  Eye Contact:  Good  Psychomotor Behavior:  Appropriate  Affect:  Full range  Mood: anxious  Speech:  Normal  Thought Process:  Goal directed and Linear  Thought Content:  Mood congruent  Suicidal:  None  Homicidal:  None  Hallucinations:  None  Delusion:  None  Memory:  Intact  Orientation:  Person, Place, Time, and Situation  Reliability:  good  Insight:  Good  Judgement:   Good  Impulse Control:  Good  Physical/Medical Issues:   None reported at this time      Assessment / Plan      Visit Diagnosis/Orders Placed This Visit:    ICD-10-CM ICD-9-CM   1. Adjustment disorder with mixed anxiety and depressed mood  F43.23 309.28      PLAN:  Safety: No acute safety concerns  Risk Assessment: Risk of self-harm acutely is low. Risk of self-harm chronically is also low, but could be further elevated in the event of treatment noncompliance and/or AODA.    Patient met with the undersigned on this day in office for individual psychotherapy session with [facility] [place] [place]  present per patient verbal consent. Therapist facilitated patient exploration of the impact of symptoms and daily life stressors upon current thoughts, feelings, behavior, and interpersonal relationships in session and offered support and validation of patient's emotional experience. Therapist and patient discussed cognitive restructuring/reframing as well as the importance of maintaining healthy interpersonal boundaries for emotional regulation at this time. Patient denied any current suicidal or homicidal ideation. He further denied any death wishes or auditory/visual hallucinations; oriented to person, place, time, and situation. Patient will continue outpatient psychotherapy as scheduled.    Treatment Plan/Goals: Continue supportive psychotherapy efforts and medications as indicated. Treatment and medication options discussed during today's visit. Patient ackowledged and verbally consented to continue with current treatment plan and was educated on the importance of compliance with treatment and follow-up appointments. Patient seems reasonably able to adhere to treatment plan.      Assisted Patient in processing above session content; acknowledged and normalized patient’s thoughts, feelings, and concerns.  Rationalized patient thought process regarding symptoms and daily life stressors.      Allowed Patient to freely discuss  issues  without interruption or judgement with unconditional positive regard, active listening skills, and empathy. Therapist provided a safe, confidential environment to facilitate the development of a positive therapeutic relationship and encouraged open, honest communication.  Assisted Patient in processing session content; acknowledged and normalized Patient’s thoughts, feelings, and concerns by utilizing a person-centered approach in efforts to build appropriate rapport and a positive therapeutic relationship with open and honest communication.      Quality Measures:     TOBACCO USE:  Former smoker    Follow Up:   Return in about 4 weeks (around 4/24/2025) for Psychotherapy Follow-Up.      Maria Del Carmen Landaverde LCSW

## 2025-04-23 ENCOUNTER — OFFICE VISIT (OUTPATIENT)
Dept: FAMILY MEDICINE CLINIC | Facility: CLINIC | Age: 25
End: 2025-04-23
Payer: COMMERCIAL

## 2025-04-23 VITALS
TEMPERATURE: 97.4 F | OXYGEN SATURATION: 96 % | HEIGHT: 73 IN | DIASTOLIC BLOOD PRESSURE: 80 MMHG | WEIGHT: 226 LBS | RESPIRATION RATE: 18 BRPM | BODY MASS INDEX: 29.95 KG/M2 | SYSTOLIC BLOOD PRESSURE: 132 MMHG | HEART RATE: 91 BPM

## 2025-04-23 DIAGNOSIS — R41.840 ATTENTION DEFICIT: ICD-10-CM

## 2025-04-23 DIAGNOSIS — F41.1 GENERALIZED ANXIETY DISORDER: ICD-10-CM

## 2025-04-23 DIAGNOSIS — I15.1 HYPERTENSION SECONDARY TO OTHER RENAL DISORDERS: ICD-10-CM

## 2025-04-23 PROCEDURE — 1159F MED LIST DOCD IN RCRD: CPT | Performed by: FAMILY MEDICINE

## 2025-04-23 PROCEDURE — 99214 OFFICE O/P EST MOD 30 MIN: CPT | Performed by: FAMILY MEDICINE

## 2025-04-23 PROCEDURE — 3075F SYST BP GE 130 - 139MM HG: CPT | Performed by: FAMILY MEDICINE

## 2025-04-23 PROCEDURE — 1160F RVW MEDS BY RX/DR IN RCRD: CPT | Performed by: FAMILY MEDICINE

## 2025-04-23 PROCEDURE — 1126F AMNT PAIN NOTED NONE PRSNT: CPT | Performed by: FAMILY MEDICINE

## 2025-04-23 PROCEDURE — 3079F DIAST BP 80-89 MM HG: CPT | Performed by: FAMILY MEDICINE

## 2025-04-23 RX ORDER — LISINOPRIL 30 MG/1
30 TABLET ORAL DAILY
Qty: 90 TABLET | Refills: 1 | Status: SHIPPED | OUTPATIENT
Start: 2025-04-23

## 2025-04-23 RX ORDER — ESCITALOPRAM OXALATE 10 MG/1
10 TABLET ORAL DAILY
Qty: 90 TABLET | Refills: 1 | Status: SHIPPED | OUTPATIENT
Start: 2025-04-23

## 2025-04-23 RX ORDER — ATOMOXETINE 100 MG/1
100 CAPSULE ORAL DAILY
Qty: 90 CAPSULE | Refills: 1 | Status: SHIPPED | OUTPATIENT
Start: 2025-04-23

## 2025-04-23 NOTE — PROGRESS NOTES
Subjective   Avi Mandel is a 25 y.o. male.     Hypertension       Overall his mood has been doing well  Focus is doing better on the strattera, sometimes wonders if this could be improved though  His anxiety is ok but the timing of dosing has been problematic as he has an atypical schedule      Avi Mandel  is here for follow-up of hypertension of several years duration. He is not exercising and is not adherent to a low-salt diet. Patient does not check his blood pressure.    Cardiovascular risk factors: hypertension and male gender. He is compliant with meds.      The following portions of the patient's history were reviewed and updated as appropriate: allergies, current medications, past family history, past medical history, past social history, past surgical history, and problem list.    Review of Systems   Constitutional: Negative.    Respiratory: Negative.     Psychiatric/Behavioral: Negative.         Objective   Physical Exam  Vitals and nursing note reviewed.   Constitutional:       General: He is not in acute distress.     Appearance: Normal appearance. He is well-developed.   Cardiovascular:      Rate and Rhythm: Normal rate and regular rhythm.      Heart sounds: Normal heart sounds.   Pulmonary:      Effort: Pulmonary effort is normal.      Breath sounds: Normal breath sounds.   Neurological:      Mental Status: He is alert and oriented to person, place, and time.   Psychiatric:         Mood and Affect: Mood normal.         Behavior: Behavior normal.         Thought Content: Thought content normal.         Judgment: Judgment normal.       Assessment & Plan   Diagnoses and all orders for this visit:    1. Generalized anxiety disorder  -     escitalopram (LEXAPRO) 10 MG tablet; Take 1 tablet by mouth Daily.  Dispense: 90 tablet; Refill: 1    2. Attention deficit  -     atomoxetine (Strattera) 100 MG capsule; Take 1 capsule by mouth Daily.  Dispense: 90 capsule; Refill: 1    3. Hypertension  secondary to other renal disorders  -     lisinopril (PRINIVIL,ZESTRIL) 30 MG tablet; Take 1 tablet by mouth Daily.  Dispense: 90 tablet; Refill: 1    Overall pt doing quite well!  Will continue lexapro, he feels that mood has improved  Focus is better on strattera, he wonders if this could be further improved.  We will try 100 mg and recheck in future  No change in lisinopril 30, BP doing great

## 2025-05-22 ENCOUNTER — OFFICE VISIT (OUTPATIENT)
Dept: BEHAVIORAL HEALTH | Facility: CLINIC | Age: 25
End: 2025-05-22
Payer: COMMERCIAL

## 2025-05-22 VITALS
HEART RATE: 77 BPM | SYSTOLIC BLOOD PRESSURE: 139 MMHG | BODY MASS INDEX: 28.88 KG/M2 | HEIGHT: 74 IN | WEIGHT: 225 LBS | DIASTOLIC BLOOD PRESSURE: 86 MMHG

## 2025-05-22 DIAGNOSIS — G47.33 OSA (OBSTRUCTIVE SLEEP APNEA): ICD-10-CM

## 2025-05-22 DIAGNOSIS — F43.23 ADJUSTMENT DISORDER WITH MIXED ANXIETY AND DEPRESSED MOOD: Primary | ICD-10-CM

## 2025-05-22 DIAGNOSIS — F90.9 ATTENTION DEFICIT HYPERACTIVITY DISORDER (ADHD), UNSPECIFIED ADHD TYPE: ICD-10-CM
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
## 2025-05-23 ENCOUNTER — TELEPHONE (OUTPATIENT)
Dept: BEHAVIORAL HEALTH | Facility: CLINIC | Age: 25
End: 2025-05-23
Payer: COMMERCIAL

## 2025-05-23 ENCOUNTER — OFFICE VISIT (OUTPATIENT)
Dept: BEHAVIORAL HEALTH | Facility: CLINIC | Age: 25
End: 2025-05-23
Payer: COMMERCIAL

## 2025-05-23 DIAGNOSIS — F43.23 ADJUSTMENT DISORDER WITH MIXED ANXIETY AND DEPRESSED MOOD: Primary | ICD-10-CM

## 2025-05-23 DIAGNOSIS — R41.840 ATTENTION DEFICIT: ICD-10-CM

## 2025-05-23 RX ORDER — ATOMOXETINE 80 MG/1
80 CAPSULE ORAL DAILY
Qty: 30 CAPSULE | Refills: 5 | OUTPATIENT
Start: 2025-05-23

## 2025-05-23 NOTE — PROGRESS NOTES
[facility]                  Follow Up Adult      Follow Up Adult Note     Date:2025   Patient Name: Avi Mandel  : 2000   MRN: [id number]   Time IN: 11:00 AM    Time OUT: 11:45 AM     Referring Provider: Andrea Ivory MD    Chief Complaint:      ICD-10-CM ICD-9-CM   1. Adjustment disorder with mixed anxiety and depressed mood  F43.23 309.28      History of Present Illness:   Avi Mandel is a 25 y.o. male who is being seen today for follow up individual Psychotherapy session.      Subjective     Patient's Support Network Includes:  extended family and friends    Functional Status: Mild impairment     Progress toward goal: Not at goal    Prognosis: Good with Ongoing Treatment     Medications:     Current Outpatient Medications:     atomoxetine (Strattera) 100 MG capsule, Take 1 capsule by mouth Daily., Disp: 90 capsule, Rfl: 1    escitalopram (LEXAPRO) 10 MG tablet, Take 1 tablet by mouth Daily., Disp: 90 tablet, Rfl: 1    lisinopril (PRINIVIL,ZESTRIL) 30 MG tablet, Take 1 tablet by mouth Daily., Disp: 90 tablet, Rfl: 1    Allergies:   Allergies   Allergen Reactions    No Known Drug Allergy        Objective     Mental Status Exam:   Hygiene:   good  Cooperation:  Cooperative  Eye Contact:  Good  Psychomotor Behavior:  Appropriate  Affect:  Full range  Mood: euthymic  Speech:  Normal  Thought Process:  Goal directed and Linear  Thought Content:  Mood congruent  Suicidal:  None  Homicidal:  None  Hallucinations:  None  Delusion:  None  Memory:  Intact  Orientation:  Person, Place, Time, and Situation  Reliability:  good  Insight:  Good  Judgement:  Good  Impulse Control:  Good  Physical/Medical Issues:   None reported at this time      Assessment / Plan      Visit Diagnosis/Orders Placed This Visit:    ICD-10-CM ICD-9-CM   1. Adjustment disorder with mixed anxiety and depressed mood  F43.23 309.28      PLAN:  Safety: No acute safety  concerns  Risk Assessment: Risk of self-harm acutely is low. Risk of self-harm chronically is also low, but could be further elevated in the event of treatment noncompliance and/or AODA.    Patient met with the undersigned on this day in office for individual psychotherapy session. Therapist facilitated patient exploration of the impact of symptoms and daily life stressors upon current thoughts, feelings, behavior, and interpersonal relationships in session and offered support and validation of patient's emotional experience. Therapist and patient collaboratively established patient's treatment goals moving forward and discussed cognitive restructuring/reframing for emotional regulation at this time. Patient denied any current suicidal or homicidal ideation. He further denied any death wishes or auditory/visual hallucinations; oriented to person, place, time, and situation. Patient will continue outpatient psychotherapy as scheduled.     Treatment Plan/Goals: Continue supportive psychotherapy efforts and medications as indicated. Treatment and medication options discussed during today's visit. Patient ackowledged and verbally consented to continue with current treatment plan and was educated on the importance of compliance with treatment and follow-up appointments. Patient seems reasonably able to adhere to treatment plan.      Assisted Patient in processing above session content; acknowledged and normalized patient’s thoughts, feelings, and concerns.  Rationalized patient thought process regarding symptoms, daily life stressors, and treatment plan.      Allowed Patient to freely discuss issues  without interruption or judgement with unconditional positive regard, active listening skills, and empathy. Therapist provided a safe, confidential environment to facilitate the development of a positive therapeutic relationship and encouraged open, honest communication. Assisted Patient in processing session content;  acknowledged and normalized Patient’s thoughts, feelings, and concerns by utilizing a person-centered approach in efforts to build appropriate rapport and a positive therapeutic relationship with open and honest communication.      Quality Measures:     TOBACCO USE:  Current every day smoker    Follow Up:   Return in about 3 months (around 8/19/2025) for Psychotherapy Follow-Up.      Maria Del Carmen Landaverde LCSW

## 2025-05-23 NOTE — TREATMENT PLAN
Multi-Disciplinary Problems (from Behavioral Health Treatment Plan)      Active Problems       Problem: Assessment/EAP  Start Date: 05/23/25      Problem Details: Patient will attend outpatient psychotherapy and medication management appointments as scheduled and take medication as directed.          Goal Priority Start Date Expected End Date End Date    Patient will utilize appropriate treatment services. High 05/23/25 11/21/25 --    Goal Details: Progress toward goal: Initial treatment plan        Goal Intervention Frequency Start Date End Date    Assist patient in developing a plan of action Q2 Weeks 05/23/25 --    Intervention Details: Duration of treatment: Until remission of symptoms                Problem: Adjustment  Start Date: 05/23/25      Problem Details: The patient self-scales this problem as a 5 out of 10; with 10 being the worst. She and identified his most concerning symptoms as: struggling to maintain daily routine/structure, anxiety, and difficulty managing daily life stressors.           Goal Priority Start Date Expected End Date End Date    Patient will implement healthy coping strategies. Medium 05/23/25 11/21/25 --    Goal Details: Progress toward goal: Initial treatment plan        Goal Intervention Frequency Start Date End Date    Assist patient to identify and develop healthy coping strategies Q2 Weeks 05/23/25 --    Intervention Details: Duration of treatment: Until remission of symptoms                        Reviewed By       Maria Del Carmen Landaverde LCSW 05/23/25 1140                   Therapist reviewed and discussed treatment plan with patient in session.